# Patient Record
Sex: MALE | Race: WHITE | Employment: FULL TIME | URBAN - METROPOLITAN AREA
[De-identification: names, ages, dates, MRNs, and addresses within clinical notes are randomized per-mention and may not be internally consistent; named-entity substitution may affect disease eponyms.]

---

## 2020-03-09 ENCOUNTER — HOSPITAL ENCOUNTER (EMERGENCY)
Age: 25
Discharge: HOME | End: 2020-03-09
Attending: EMERGENCY MEDICINE
Payer: COMMERCIAL

## 2020-03-09 VITALS
HEIGHT: 71 IN | RESPIRATION RATE: 16 BRPM | OXYGEN SATURATION: 97 % | SYSTOLIC BLOOD PRESSURE: 111 MMHG | BODY MASS INDEX: 36.82 KG/M2 | HEART RATE: 110 BPM | WEIGHT: 263 LBS | TEMPERATURE: 98.9 F | DIASTOLIC BLOOD PRESSURE: 78 MMHG

## 2020-03-09 PROCEDURE — 6370000000 HC RX 637 (ALT 250 FOR IP): Performed by: PHYSICIAN ASSISTANT

## 2020-03-09 PROCEDURE — 99282 EMERGENCY DEPT VISIT SF MDM: CPT

## 2020-03-09 RX ORDER — AMOXICILLIN 875 MG/1
875 TABLET, COATED ORAL 2 TIMES DAILY
COMMUNITY
End: 2021-03-27 | Stop reason: ALTCHOICE

## 2020-03-09 RX ORDER — NEOMYCIN SULFATE, POLYMYXIN B SULFATE AND HYDROCORTISONE 10; 3.5; 1 MG/ML; MG/ML; [USP'U]/ML
3 SUSPENSION/ DROPS AURICULAR (OTIC) ONCE
Status: COMPLETED | OUTPATIENT
Start: 2020-03-09 | End: 2020-03-09

## 2020-03-09 RX ORDER — IBUPROFEN 800 MG/1
800 TABLET ORAL EVERY 6 HOURS PRN
COMMUNITY
End: 2022-02-24 | Stop reason: ALTCHOICE

## 2020-03-09 RX ORDER — HYDROCODONE BITARTRATE AND ACETAMINOPHEN 5; 325 MG/1; MG/1
1 TABLET ORAL ONCE
Status: COMPLETED | OUTPATIENT
Start: 2020-03-09 | End: 2020-03-09

## 2020-03-09 RX ORDER — HYDROCODONE BITARTRATE AND ACETAMINOPHEN 5; 325 MG/1; MG/1
1 TABLET ORAL EVERY 8 HOURS PRN
Qty: 10 TABLET | Refills: 0 | Status: SHIPPED | OUTPATIENT
Start: 2020-03-09 | End: 2020-03-12

## 2020-03-09 RX ORDER — CIPROFLOXACIN AND DEXAMETHASONE 3; 1 MG/ML; MG/ML
4 SUSPENSION/ DROPS AURICULAR (OTIC) 2 TIMES DAILY
COMMUNITY
End: 2021-03-29 | Stop reason: ALTCHOICE

## 2020-03-09 RX ADMIN — NEOMYCIN SULFATE, POLYMYXIN B SULFATE AND HYDROCORTISONE 3 DROP: 10; 3.5; 1 SUSPENSION/ DROPS AURICULAR (OTIC) at 21:40

## 2020-03-09 RX ADMIN — HYDROCODONE BITARTRATE AND ACETAMINOPHEN 1 TABLET: 5; 325 TABLET ORAL at 21:39

## 2020-03-09 ASSESSMENT — PAIN DESCRIPTION - LOCATION: LOCATION: EAR

## 2020-03-09 ASSESSMENT — PAIN DESCRIPTION - ORIENTATION: ORIENTATION: RIGHT;LEFT

## 2020-03-09 ASSESSMENT — PAIN SCALES - GENERAL: PAINLEVEL_OUTOF10: 9

## 2020-03-10 NOTE — ED PROVIDER NOTES
eMERGENCY dEPARTMENT eNCOUnter   Independent Attestation     Pt Name: Carmen Geiger  MRN: [id number]  [place] 1995  Date of evaluation: 3/10/20     Carmen Geiger is a 30 y.o. male with CC: Otalgia (bilat right greater than left with pain)      Based on the medical record the care appears appropriate. I was personally available for consultation in the Emergency Department.     Yordy Helton MD  Attending Emergency Physician                    Shana Velasquez MD  03/10/20 9579

## 2020-03-10 NOTE — ED PROVIDER NOTES
reactive to light  HENT:  Atraumatic. External ears normal, bilateral TM not visualized due to complete obstruction of the bilateral ear canals,  Nose normal, oropharynx moist. Neck- supple, no lymphadenopathy  Respiratory:  Clear to auscultation bilaterally with good air exchange, no W/R/R  Cardiovascular:  RRR with normal S1 and S2  Gastrointestinal/Abdomen:  Soft, nontender. BS active in all quadrants. Musculoskeletal:  No edema, no tenderness, no deformities. Integument:  No rash, no diaphoresis. Neurologic:  Alert & oriented x 3, no focal deficits noted       DIAGNOSTIC RESULTS     RADIOLOGY:   Reviewed the radiologist:  No orders to display           LABS:  Labs Reviewed - No data to display      EMERGENCY DEPARTMENT COURSE:   -------------------------  Ear aline placed bilaterally with polymyxin, instructed to continue with medication as prescribed previously, will give pain medication due to pain give note for work. Orders Placed This Encounter   Medications    HYDROcodone-acetaminophen (NORCO) 5-325 MG per tablet 1 tablet    neomycin-polymyxin-hydrocortisone (CORTISPORIN) otic suspension 3 drop    HYDROcodone-acetaminophen (NORCO) 5-325 MG per tablet     Sig: Take 1 tablet by mouth every 8 hours as needed for Pain for up to 3 days. Dispense:  10 tablet     Refill:  0       CONSULTS:  None      FINAL IMPRESSION      1.  Infective otitis externa of both ears          DISPOSITION/PLAN:  DISPOSITION Decision To Discharge 03/09/2020 10:53:49 PM        PATIENT REFERRED TO:  David Martin  [address] [place] [address] [address] [facility]  [address]    Schedule an appointment as soon as possible for a visit       [address] [place] [address]  [facility]  [address] [facility]  [phone]    For worsening symptoms, or any other concern      DISCHARGE MEDICATIONS:  Discharge Medication List as of 3/9/2020 10:54 PM      START taking these medications    Details   HYDROcodone-acetaminophen ([address]) 5-325 MG per tablet Take 1 tablet by mouth every 8 hours as needed for Pain for up to 3 days. , Disp-10 tablet, R-0Print             (Please note that portions of this note were completed with a voice recognition program.  Efforts were made to edit the dictations but occasionally words are mis-transcribed.)    JA Camargo PA  03/10/20 0028

## 2020-08-21 ENCOUNTER — HOSPITAL ENCOUNTER (OUTPATIENT)
Age: 25
Discharge: HOME | End: 2020-08-21
Payer: COMMERCIAL

## 2020-08-21 PROCEDURE — U0003 INFECTIOUS AGENT DETECTION BY NUCLEIC ACID (DNA OR RNA); SEVERE ACUTE RESPIRATORY SYNDROME CORONAVIRUS 2 (SARS-COV-2) (CORONAVIRUS DISEASE [COVID-19]), AMPLIFIED PROBE TECHNIQUE, MAKING USE OF HIGH THROUGHPUT TECHNOLOGIES AS DESCRIBED BY CMS-2020-01-R: HCPCS

## 2020-08-23 LAB — SARS-COV-2, NAA: NOT DETECTED

## 2020-12-15 ENCOUNTER — HOSPITAL ENCOUNTER (OUTPATIENT)
Age: 25
Discharge: HOME | End: 2020-12-15
Payer: COMMERCIAL

## 2020-12-15 PROCEDURE — U0003 INFECTIOUS AGENT DETECTION BY NUCLEIC ACID (DNA OR RNA); SEVERE ACUTE RESPIRATORY SYNDROME CORONAVIRUS 2 (SARS-COV-2) (CORONAVIRUS DISEASE [COVID-19]), AMPLIFIED PROBE TECHNIQUE, MAKING USE OF HIGH THROUGHPUT TECHNOLOGIES AS DESCRIBED BY CMS-2020-01-R: HCPCS

## 2020-12-16 LAB
SARS-COV-2, RAPID: ABNORMAL
SARS-COV-2: ABNORMAL
SARS-COV-2: DETECTED
SOURCE: ABNORMAL

## 2020-12-17 ENCOUNTER — TELEPHONE (OUTPATIENT)
Dept: ADMINISTRATIVE | Age: 25
End: 2020-12-17

## 2021-03-28 NOTE — PROGRESS NOTES
[facility] [[email or web address]] PHYSICIANS  [facility]  [address] [facility] [address].  [address] [address]  Dept: [phone]     3/29/2021 No chief complaint on file. CADENCE Marroquin (:  1995) is a 27 y.o. male is an established patient ***. Patient has a history of ***  No data recorded    []Negative depression screening. []1-4 = Minimal depression   []5-9 = Mild depression   []10-14 = Moderate depression   []15-19 = Moderately severe depression   []20-27 = Severe depression  No flowsheet data found. Counseling given: Not Answered    There is no problem list on file for this patient. No past medical history on file. Past Surgical History:   Procedure Laterality Date    KNEE SURGERY      TONSILLECTOMY     No family history on file. Current Outpatient Medications   Medication Sig Dispense Refill    ibuprofen (ADVIL;MOTRIN) 800 MG tablet Take 800 mg by mouth every 6 hours as needed for Pain      amoxicillin (AMOXIL) 875 MG tablet Take 875 mg by mouth 2 times daily      ciprofloxacin-dexamethasone (CIPRODEX) 0.3-0.1 % otic suspension 4 drops 2 times daily       No current facility-administered medications for this visit. Review of Systems   Prior to Visit Medications    Medication Sig Taking? Authorizing Provider   ibuprofen (ADVIL;MOTRIN) 800 MG tablet Take 800 mg by mouth every 6 hours as needed for Pain  Historical Provider, MD   amoxicillin (AMOXIL) 875 MG tablet Take 875 mg by mouth 2 times daily  Historical Provider, MD   ciprofloxacin-dexamethasone (CIPRODEX) 0.3-0.1 % otic suspension 4 drops 2 times daily  Historical Provider, MD      Social History     Tobacco Use    Smoking status: Never Smoker    Smokeless tobacco: Never Used   Substance Use Topics    Alcohol use: Yes      There were no vitals filed for this visit. Estimated body mass index is 36.68 kg/m² as calculated from the following:    Height as of 3/9/20: 5' 11\" (1.803 m).     Weight as of 3/9/20: 263 lb (119.3 kg).  Physical Exam  ASSESSMENT/PLAN:  There are no diagnoses linked to this encounter. Controlled Substance Monitoring:  Acute and Chronic Pain Monitoring:   RX Monitoring 8/19/2015   Periodic Controlled Substance Monitoring No signs of potential drug abuse or diversion identified. No orders of the defined types were placed in this encounter. No orders of the defined types were placed in this encounter. There are no discontinued medications. Health Maintenance Due   Topic Date Due    Hepatitis C screen  Never done    Varicella vaccine (1 of 2 - 2-dose childhood series) Never done    HIV screen  Never done    COVID-19 Vaccine (1) Never done    DTaP/Tdap/Td vaccine (1 - Tdap) Never done    Flu vaccine (1) Never done      No follow-ups on file. {Provider UDLB:[id number]}   This note was completed by using the assistance of a speech-recognition program. However, inadvertent computerized transcription errors may be present. Although every effort was made to ensure accuracy, no guarantees can be provided that every mistake has been identified and corrected by editing   An electronic signature was used to authenticate this note.   --HARDIK Mcgarry - CNP on 3/27/2021 at 8:51 PM

## 2021-03-28 NOTE — PATIENT INSTRUCTIONS
[facility] COVID-19 Vaccination Hotline: [phone]    COVID-19 vaccine appointments are not available through our practice. As you're eligible to receive the COVID-19 vaccine, as determined by your state's department of health, you will be able to schedule an appointment through [address] or by calling [phone]. Appointments are required to receive the COVID-19 vaccine. As vaccine supply continues to be limited, we anticipate open appointments to fill up quickly and appreciate your patience as we work through the process of providing vaccines to those in our communities. Schedule a Vaccine  When you qualify to receive the vaccine, call the [facility] COVID-19 Vaccination Hotline to schedule your appointment or to get additional information about the [facility] locations which are offering the COVID-19 vaccine. To be 94% effective, it's important that you receive two doses of one of the COVID-19 vaccines. -If you are receiving the Valladares Peter vaccine, your second shot will be scheduled as close to 21 days after the first shot as possible. -If you are receiving the Moderna vaccine, your second shot will be scheduled as close to 28 days after the first shot as possible. Links to [facility]) website and [facility] website:    [employer]/mercy-[facility]-monitoring-coronavirus-covid-19/covid-19-vaccine/[place]/duran-vaccine    [email or web address]/covidvaccine    "[employer]  https://sites. google. com/view/wchd[place]-coronavirus/home/vaccines/get-vaccinated  LocalElectrolysis.fi. com/r/[place]OVID_Vaccine_On-Call_List      https://Curasight/shared/[email or web address]?:toolbar=n&:display_count=n&:origin=viz_share_link&:embed=y    PHARMACY LOCATIONS  Https://vaccine. coronavirus. [place].gov/    [facility]  Https://Curasight/shared/[email or web address]?:toolbar=n&:display_count=n&:origin=viz_share_link&:embed=y    [facility] Https://The World [employer] Pictures/shared/[email or web address]?:toolbar=n&:display_count=n&:origin=ACell_share_link&:embed=y    [address]  Https://[employer]/shared/[email or web address]?:toolbar=n&:display_count=n&:origin=viz_share_link&:embed=y    [address]  https://The World [employer] Pictures/shared/[email or web address]?:toolbar=n&:display_count=n&:origin=ACell_share_link&:embed=y    tdapPatient Education        Tdap (Tetanus, Diphtheria, Pertussis) Vaccine: What You Need to Know  Why get vaccinated? Tdap vaccine can prevent tetanus, diphtheria, and pertussis. Diphtheria and pertussis spread from person to person. Tetanus enters the body through cuts or wounds. · TETANUS (T) causes painful stiffening of the muscles. Tetanus can lead to serious health problems, including being unable to open the mouth, having trouble swallowing and breathing, or death. · DIPHTHERIA (D) can lead to difficulty breathing, heart failure, paralysis, or death. · PERTUSSIS (aP), also known as \"whooping cough,\" can cause uncontrollable, violent coughing which makes it hard to breathe, eat, or drink. Pertussis can be extremely serious in babies and young children, causing pneumonia, convulsions, brain damage, or death. In teens and adults, it can cause weight loss, loss of bladder control, passing out, and rib fractures from severe coughing. Tdap vaccine  Tdap is only for children 7 years and older, adolescents, and adults. Adolescents should receive a single dose of Tdap, preferably at age 6 or 15 years. Pregnant women should get a dose of Tdap during every pregnancy, to protect the  from pertussis. Infants are most at risk for severe, life threatening complications from pertussis. Adults who have never received Tdap should get a dose of Tdap. Also, adults should receive a booster dose every 10 years, or earlier in the case of a severe and dirty wound or burn.  Booster doses can be either Tdap or Td (a different vaccine that protects against tetanus and diphtheria but not pertussis). Tdap may be given at the same time as other vaccines. Talk with your health care provider  Tell your vaccine provider if the person getting the vaccine:  · Has had an allergic reaction after a previous dose of any vaccine that protects against tetanus, diphtheria, or pertussis, or has any severe, life threatening allergies. · Has had a coma, decreased level of consciousness, or prolonged seizures within 7 days after a previous dose of any pertussis vaccine (DTP, DTaP, or Tdap). · Has seizures or another nervous system problem. · Has ever had Guillain-Barré Syndrome (also called GBS). · Has had severe pain or swelling after a previous dose of any vaccine that protects against tetanus or diphtheria. In some cases, your health care provider may decide to postpone Tdap vaccination to a future visit. People with minor illnesses, such as a cold, may be vaccinated. People who are moderately or severely ill should usually wait until they recover before getting Tdap vaccine. Your health care provider can give you more information. Risks of a vaccine reaction  · Pain, redness, or swelling where the shot was given, mild fever, headache, feeling tired, and nausea, vomiting, diarrhea, or stomachache sometimes happen after Tdap vaccine. People sometimes faint after medical procedures, including vaccination. Tell your provider if you feel dizzy or have vision changes or ringing in the ears. As with any medicine, there is a very remote chance of a vaccine causing a severe allergic reaction, other serious injury, or death. What if there is a serious problem? An allergic reaction could occur after the vaccinated person leaves the clinic.  If you see signs of a severe allergic reaction (hives, swelling of the face and throat, difficulty breathing, a fast heartbeat, dizziness, or weakness), call 9-1-1 and get the person to the nearest hospital.  For other signs that concern you, call your health care provider. Adverse reactions should be reported to the Vaccine Adverse Event Reporting System (VAERS). Your health care provider will usually file this report, or you can do it yourself. Visit the VAERS website at www.vaers. hhs.gov or call 3-740.891.7018. VAERS is only for reporting reactions, and VAERS staff do not give medical advice. The National Vaccine Injury Compensation Program  The National Vaccine Injury Compensation Program (VICP) is a federal program that was created to compensate people who may have been injured by certain vaccines. Visit the VICP website at www.hrsa.gov/vaccinecompensation or call 1-348.817.5689 to learn about the program and about filing a claim. There is a time limit to file a claim for compensation. How can I learn more? · Ask your health care provider. · Call your local or state health department. · Contact the Centers for Disease Control and Prevention (CDC):  ? Call 2-656.639.9341 (1-800-CDC-INFO) or  ? Visit CDC's website at www.cdc.gov/vaccines  Vaccine Information Statement (Interim)  Tdap (Tetanus, Diphtheria, Pertussis) Vaccine  04/01/2020  [id number]  Department of Health and Human Services  Centers for Disease Control and Prevention  Many Vaccine Information Statements are available in Azeri and other languages. See www.immunize.org/vis. Muchas hojas de información sobre vacunas están disponibles en español y en otros idiomas. Visite www.immunize.org/vis. Care instructions adapted under license by [facility] ([facility]). If you have questions about a medical condition or this instruction, always ask your healthcare professional. [address] any warranty or liability for your use of this information.

## 2021-03-28 NOTE — PROGRESS NOTES
[facility] & [facility] PHYSICIANS  [facility] PHYSICIANS [address] [facility] [address].  [address] [address]  Dept: Girish Asif (:  1995) is a 27 y.o. male,Established patient, here for evaluation of the following chief complaint(s):  Chief Complaint   Patient presents with    New Patient    Establish Care    GI Problem     x 2 years         SUBJECTIVE/OBJECTIVE:  HPI   Patient to the office. He was referred by a coworker that also worked for him in the past.  Patient is fairly healthy. Patient admits to smoking cigars occasionally 1 or twice a month. He does admit to drinking alcohol 1-2 times a week. He drinks beer anywhere from 2 to 12 pack at a time depending on the location. He denies any use of any type of recreational drugs such as marijuana. He is single, living on his own. Has had long-term relationship in the past no kids. 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 He is coming from a family a paramedics. He is to play football in high school and most other sports including wrestling. Patient reported a right meniscal tear from playing sports. Repair was done when he was 10 years ago with no follow-up done no current issues. Patient also reported a previous tonsillectomy due to some recurrent tonsillitis. This was also done several days ago no follow-up done no current problems. Patient also reported a previous Covid infection back in December. Patient did not have any critical symptoms and did not tell me in the hospital in fact, his symptoms were minor. He stayed home in quarantine appropriately. He does not want to get his Covid vaccine done. We had some discussion about this. He also does not want to have any other vaccines including influenza. GERD  Patient reported an ongoing and worsening symptoms of GERD. He reports some heartburn, dyspepsia, and indigestion. This has been going on for a while antacid. testicular cancer on his paternal side. His uncle specifically. Vitals:    03/29/21 1030   BP: 110/80   Pulse: 97   Temp: 97.9 °F (36.6 °C)   SpO2: 98%   Weight: 261 lb (118.4 kg)   Height: 5' 11\" (1.803 m)   Estimated body mass index is 36.4 kg/m² as calculated from the following:    Height as of this encounter: 5' 11\" (1.803 m). Weight as of this encounter: 261 lb (118.4 kg). Review of Systems   Constitutional: Positive for unexpected weight change. Negative for activity change, chills and fever. HENT: Negative. Respiratory: Negative. Negative for shortness of breath. Cardiovascular: Negative. Negative for chest pain and palpitations. Gastrointestinal: Positive for abdominal pain, constipation and diarrhea. Heartburn   Endocrine: Negative. Genitourinary: Negative for dysuria. Musculoskeletal: Negative for arthralgias and myalgias. Skin: Negative for rash. Neurological: Negative for headaches. Psychiatric/Behavioral: Negative for sleep disturbance and suicidal ideas. The patient is nervous/anxious. Physical Exam  Vitals signs and nursing note reviewed. Constitutional:       Appearance: He is well-developed. He is obese. HENT:      Head: Normocephalic. Right Ear: External ear normal.      Left Ear: External ear normal.      Nose: Nose normal.      Mouth/Throat:      Mouth: Mucous membranes are moist.   Eyes:      Pupils: Pupils are equal, round, and reactive to light. Neck:      Musculoskeletal: Normal range of motion and neck supple. Cardiovascular:      Rate and Rhythm: Normal rate and regular rhythm. Pulses: Normal pulses. Heart sounds: Normal heart sounds. Pulmonary:      Effort: Pulmonary effort is normal. No respiratory distress. Breath sounds: Normal breath sounds. Abdominal:      General: Abdomen is protuberant. Bowel sounds are normal. There is no distension. Palpations: Abdomen is soft. Tenderness:  There is no abdominal tenderness. Comments: obese   Musculoskeletal: Normal range of motion. Skin:     General: Skin is warm and dry. Capillary Refill: Capillary refill takes less than 2 seconds. Neurological:      Mental Status: He is alert and oriented to person, place, and time. Psychiatric:         Mood and Affect: Mood is anxious. Speech: Speech is not rapid and pressured. Behavior: Behavior normal.         Thought Content: Thought content does not include homicidal or suicidal ideation. History reviewed. No pertinent past medical history. Prior to Visit Medications    Medication Sig Taking? Authorizing Provider   famotidine (PEPCID) 20 MG tablet Take 1 tablet by mouth 2 times daily Yes HARDIK Burns - CNP   dicyclomine (BENTYL) 10 MG capsule Take 1 capsule by mouth 4 times daily Yes HARDIK Burns CNP   ibuprofen (ADVIL;MOTRIN) 800 MG tablet Take 800 mg by mouth every 6 hours as needed for Pain  Historical Provider, MD       ASSESSMENT/PLAN:  1. Gastroesophageal reflux disease, unspecified whether esophagitis present  Worsening  Continue therapy  DISCUSSED AND ADVISED TO:  Avoid food triggers. Stop eating large meals close to bedtime  Don't eat meals too close to bedtime  Avoid ASA, NSAID's, caffeine, peppermints, alcohol and tobacco.  Report for worsening symptoms. -     famotidine (PEPCID) 20 MG tablet; Take 1 tablet by mouth 2 times daily, Disp-60 tablet, R-3Normal  2. Irritable bowel syndrome with both constipation and diarrhea  Failure to Improve  Continue therapy. DISCUSSED and ADVISED TO:  Drink plenty of fluids, 1.5 to 2 liters a day  Increase high-fiber foods  with 25-30 g each day. These include fruits, vegetables, beans, and whole grains. Get at least 30 minutes of exercise on most days of the week. Take a fiber supplement, such as Citrucel (Methylcellulose fiber) or Metamucil (Psyllium), every day. Schedule time each day for a bowel movement. [place]     --Richa Matthew, APRN - CNP

## 2021-03-29 ENCOUNTER — OFFICE VISIT (OUTPATIENT)
Dept: FAMILY MEDICINE CLINIC | Age: 26
End: 2021-03-29
Payer: COMMERCIAL

## 2021-03-29 ENCOUNTER — HOSPITAL ENCOUNTER (OUTPATIENT)
Age: 26
Setting detail: SPECIMEN
Discharge: HOME | End: 2021-03-29
Payer: COMMERCIAL

## 2021-03-29 VITALS
SYSTOLIC BLOOD PRESSURE: 110 MMHG | HEART RATE: 97 BPM | BODY MASS INDEX: 36.54 KG/M2 | TEMPERATURE: 97.9 F | OXYGEN SATURATION: 98 % | WEIGHT: 261 LBS | DIASTOLIC BLOOD PRESSURE: 80 MMHG | HEIGHT: 71 IN

## 2021-03-29 DIAGNOSIS — E66.9 OBESITY (BMI 35.0-39.9 WITHOUT COMORBIDITY): ICD-10-CM

## 2021-03-29 DIAGNOSIS — Z13.29 SCREENING FOR ENDOCRINE DISORDER: ICD-10-CM

## 2021-03-29 DIAGNOSIS — Z13.21 ENCOUNTER FOR VITAMIN DEFICIENCY SCREENING: ICD-10-CM

## 2021-03-29 DIAGNOSIS — Z11.59 SCREENING FOR VIRAL DISEASE: ICD-10-CM

## 2021-03-29 DIAGNOSIS — Z13.220 SCREENING FOR LIPID DISORDERS: ICD-10-CM

## 2021-03-29 DIAGNOSIS — R73.9 HYPERGLYCEMIA: ICD-10-CM

## 2021-03-29 DIAGNOSIS — Z86.16 HISTORY OF COVID-19: ICD-10-CM

## 2021-03-29 DIAGNOSIS — K58.2 IRRITABLE BOWEL SYNDROME WITH BOTH CONSTIPATION AND DIARRHEA: ICD-10-CM

## 2021-03-29 DIAGNOSIS — Z98.890 S/P MEDIAL MENISCUS REPAIR OF RIGHT KNEE: ICD-10-CM

## 2021-03-29 DIAGNOSIS — Z80.43 FAMILY HISTORY OF TESTICULAR CANCER: ICD-10-CM

## 2021-03-29 DIAGNOSIS — K21.9 GASTROESOPHAGEAL REFLUX DISEASE, UNSPECIFIED WHETHER ESOPHAGITIS PRESENT: Primary | ICD-10-CM

## 2021-03-29 DIAGNOSIS — Z90.89 S/P TONSILLECTOMY: ICD-10-CM

## 2021-03-29 LAB
-: NORMAL
ABSOLUTE EOS #: 0.13 K/UL (ref 0–0.44)
ABSOLUTE IMMATURE GRANULOCYTE: 0.03 K/UL (ref 0–0.3)
ABSOLUTE LYMPH #: 1.62 K/UL (ref 1.1–3.7)
ABSOLUTE MONO #: 0.54 K/UL (ref 0.1–1.2)
AMORPHOUS: NORMAL
BACTERIA: NORMAL
BASOPHILS # BLD: 1 % (ref 0–2)
BASOPHILS ABSOLUTE: 0.06 K/UL (ref 0–0.2)
BILIRUBIN URINE: NEGATIVE
CASTS UA: NORMAL /LPF (ref 0–8)
COLOR: YELLOW
COMMENT UA: ABNORMAL
CRYSTALS, UA: NORMAL /HPF
DIFFERENTIAL TYPE: ABNORMAL
EOSINOPHILS RELATIVE PERCENT: 2 % (ref 1–4)
EPITHELIAL CELLS UA: NORMAL /HPF (ref 0–5)
GLUCOSE URINE: NEGATIVE
HCT VFR BLD CALC: 51.1 % (ref 40.7–50.3)
HEMOGLOBIN: 16.6 G/DL (ref 13–17)
HEPATITIS C ANTIBODY: NONREACTIVE
HIV AG/AB: NONREACTIVE
IMMATURE GRANULOCYTES: 1 %
KETONES, URINE: ABNORMAL
LEUKOCYTE ESTERASE, URINE: NEGATIVE
LYMPHOCYTES # BLD: 26 % (ref 24–43)
MCH RBC QN AUTO: 30 PG (ref 25.2–33.5)
MCHC RBC AUTO-ENTMCNC: 32.5 G/DL (ref 28.4–34.8)
MCV RBC AUTO: 92.4 FL (ref 82.6–102.9)
MONOCYTES # BLD: 9 % (ref 3–12)
MUCUS: NORMAL
NITRITE, URINE: NEGATIVE
NRBC AUTOMATED: 0 PER 100 WBC
OTHER OBSERVATIONS UA: NORMAL
PDW BLD-RTO: 13 % (ref 11.8–14.4)
PH UA: 7.5 (ref 5–8)
PLATELET # BLD: 262 K/UL (ref 138–453)
PLATELET ESTIMATE: ABNORMAL
PMV BLD AUTO: 10.4 FL (ref 8.1–13.5)
PROTEIN UA: ABNORMAL
RBC # BLD: 5.53 M/UL (ref 4.21–5.77)
RBC # BLD: ABNORMAL 10*6/UL
RBC UA: NORMAL /HPF (ref 0–4)
RENAL EPITHELIAL, UA: NORMAL /HPF
SEG NEUTROPHILS: 61 % (ref 36–65)
SEGMENTED NEUTROPHILS ABSOLUTE COUNT: 3.81 K/UL (ref 1.5–8.1)
SPECIFIC GRAVITY UA: 1.03 (ref 1–1.03)
TRICHOMONAS: NORMAL
TURBIDITY: ABNORMAL
URINE HGB: NEGATIVE
UROBILINOGEN, URINE: NORMAL
VITAMIN D 25-HYDROXY: 22.1 NG/ML (ref 30–100)
WBC # BLD: 6.2 K/UL (ref 3.5–11.3)
WBC # BLD: ABNORMAL 10*3/UL
WBC UA: NORMAL /HPF (ref 0–5)
YEAST: NORMAL

## 2021-03-29 PROCEDURE — 99204 OFFICE O/P NEW MOD 45 MIN: CPT | Performed by: FAMILY MEDICINE

## 2021-03-29 PROCEDURE — G8417 CALC BMI ABV UP PARAM F/U: HCPCS | Performed by: FAMILY MEDICINE

## 2021-03-29 PROCEDURE — G8484 FLU IMMUNIZE NO ADMIN: HCPCS | Performed by: FAMILY MEDICINE

## 2021-03-29 PROCEDURE — G8427 DOCREV CUR MEDS BY ELIG CLIN: HCPCS | Performed by: FAMILY MEDICINE

## 2021-03-29 PROCEDURE — 1036F TOBACCO NON-USER: CPT | Performed by: FAMILY MEDICINE

## 2021-03-29 PROCEDURE — 81003 URINALYSIS AUTO W/O SCOPE: CPT | Performed by: FAMILY MEDICINE

## 2021-03-29 RX ORDER — DICYCLOMINE HYDROCHLORIDE 10 MG/1
10 CAPSULE ORAL 4 TIMES DAILY
Qty: 360 CAPSULE | Refills: 1 | Status: SHIPPED | OUTPATIENT
Start: 2021-03-29 | End: 2021-09-17

## 2021-03-29 RX ORDER — FAMOTIDINE 20 MG/1
20 TABLET, FILM COATED ORAL 2 TIMES DAILY
Qty: 60 TABLET | Refills: 3 | Status: SHIPPED | OUTPATIENT
Start: 2021-03-29 | End: 2021-07-21

## 2021-03-29 ASSESSMENT — ENCOUNTER SYMPTOMS
DIARRHEA: 1
CONSTIPATION: 1
ABDOMINAL PAIN: 1
RESPIRATORY NEGATIVE: 1
SHORTNESS OF BREATH: 0

## 2021-03-29 ASSESSMENT — PATIENT HEALTH QUESTIONNAIRE - PHQ9
1. LITTLE INTEREST OR PLEASURE IN DOING THINGS: 0
SUM OF ALL RESPONSES TO PHQ QUESTIONS 1-9: 0
2. FEELING DOWN, DEPRESSED OR HOPELESS: 0

## 2021-03-30 LAB
ALBUMIN SERPL-MCNC: 4.8 G/DL (ref 3.5–5.2)
ALBUMIN/GLOBULIN RATIO: 1.7 (ref 1–2.5)
ALP BLD-CCNC: 70 U/L (ref 40–129)
ALT SERPL-CCNC: 43 U/L (ref 5–41)
ANION GAP SERPL CALCULATED.3IONS-SCNC: 14 MMOL/L (ref 9–17)
AST SERPL-CCNC: 28 U/L
BILIRUB SERPL-MCNC: 0.53 MG/DL (ref 0.3–1.2)
BUN BLDV-MCNC: 18 MG/DL (ref 6–20)
BUN/CREAT BLD: ABNORMAL (ref 9–20)
CALCIUM SERPL-MCNC: 9.8 MG/DL (ref 8.6–10.4)
CHLORIDE BLD-SCNC: 107 MMOL/L (ref 98–107)
CHOLESTEROL, FASTING: 195 MG/DL
CHOLESTEROL/HDL RATIO: 4.5
CO2: 24 MMOL/L (ref 20–31)
CREAT SERPL-MCNC: 1.27 MG/DL (ref 0.7–1.2)
ESTIMATED AVERAGE GLUCOSE: 108 MG/DL
GFR AFRICAN AMERICAN: >60 ML/MIN
GFR NON-AFRICAN AMERICAN: >60 ML/MIN
GFR SERPL CREATININE-BSD FRML MDRD: ABNORMAL ML/MIN/{1.73_M2}
GFR SERPL CREATININE-BSD FRML MDRD: ABNORMAL ML/MIN/{1.73_M2}
GLUCOSE FASTING: 99 MG/DL (ref 70–99)
HBA1C MFR BLD: 5.4 % (ref 4–6)
HDLC SERPL-MCNC: 43 MG/DL
LDL CHOLESTEROL: 132 MG/DL (ref 0–130)
POTASSIUM SERPL-SCNC: 4.6 MMOL/L (ref 3.7–5.3)
SODIUM BLD-SCNC: 145 MMOL/L (ref 135–144)
TOTAL PROTEIN: 7.7 G/DL (ref 6.4–8.3)
TRIGLYCERIDE, FASTING: 102 MG/DL
TSH SERPL DL<=0.05 MIU/L-ACNC: 1.68 MIU/L (ref 0.3–5)
VLDLC SERPL CALC-MCNC: ABNORMAL MG/DL (ref 1–30)

## 2021-03-31 ENCOUNTER — TELEPHONE (OUTPATIENT)
Dept: FAMILY MEDICINE CLINIC | Age: 26
End: 2021-03-31

## 2021-03-31 DIAGNOSIS — E55.9 VITAMIN D DEFICIENCY: Primary | ICD-10-CM

## 2021-03-31 LAB — VZV IGG SER QL IA: 2.64

## 2021-03-31 NOTE — TELEPHONE ENCOUNTER
Please let the patient know of the recent results. These can also be discussed further on the future visits. Patient's blood count is WNL  . Lab Results   Component Value Date    WBC 6.2 03/29/2021    HGB 16.6 03/29/2021    HCT 51.1 (H) 03/29/2021    MCV 92.4 03/29/2021     03/29/2021    LYMPHOPCT 26 03/29/2021    RBC 5.53 03/29/2021    MCH 30.0 03/29/2021    MCHC 32.5 03/29/2021    RDW 13.0 03/29/2021       Patient's thyroid function is WNL  . Lab Results   Component Value Date    TSH 1.68 03/29/2021       Patient's kidney function has some mild increase also an increase in salt. Pls advise to drink more fluids, cut down salt. .    One of his liver function is elevated. This is not very concerning. However, cut down on alcohol use. Lab Results   Component Value Date     (H) 03/29/2021    K 4.6 03/29/2021     03/29/2021    CO2 24 03/29/2021    BUN 18 03/29/2021    CREATININE 1.27 (H) 03/29/2021    GLUCOSE 101 (H) 07/20/2016    CALCIUM 9.8 03/29/2021    PROT 7.7 03/29/2021    LABALBU 4.8 03/29/2021    BILITOT 0.53 03/29/2021    ALKPHOS 70 03/29/2021    AST 28 03/29/2021    ALT 43 (H) 03/29/2021    LABGLOM >60 03/29/2021    GFRAA >60 03/29/2021    GLOB NOT REPORTED 07/20/2016       Patient's urinalysis results has ketones present. Please advise to drink more fluids. .  Lab Results   Component Value Date    COLORU YELLOW 03/29/2021    NITRU NEGATIVE 03/29/2021    GLUCOSEU NEGATIVE 03/29/2021    KETUA TRACE 03/29/2021    UROBILINOGEN Normal 03/29/2021    BILIRUBINUR NEGATIVE 03/29/2021    HGBUR NEGATIVE 03/29/2021    PROTEINU NEGATIVE  Verified by sulfosalicylic acid test. 30/66/8718    PHUR 7.5 03/29/2021    LEUKOCYTESUR NEGATIVE 03/29/2021     No results found for: CULTURE, SPECDESC, SPECIAL    Diabetes screening normal.  Lab Results   Component Value Date/Time    LABA1C 5.4 03/29/2021 11:38 AM        Vitamin D Screening  Please let the patient know that the patient's recent vitamin d level was insufficient. I will send an oral supplementation that needs to be taken weekly. We will be checking his levels on an annual basis from now on. We can discuss this condition further on her next visit. Lab Results   Component Value Date/Time    VITD25 22.1 (L) 03/29/2021 11:38 AM         Patient's lipids test results elevated bad cholesterol. Please advise the patient to:  Cut down on red meats and trans fats in their diet. Increase physical activity. Add some regular exercise at least 3 times a week on their routine  Try to lose weight to help improve their cholesterol levels. Lab Results   Component Value Date/Time    CHOLFAST 195 03/29/2021 11:38 AM    CHOL 168 07/20/2016 10:44 AM    HDL 43 03/29/2021 11:38 AM    LDLCHOLESTEROL 132 (H) 03/29/2021 11:38 AM    TRIG 84 07/20/2016 10:44 AM    CHOLHDLRATIO 4.5 03/29/2021 11:38 AM    VLDL NOT REPORTED 03/29/2021 11:38 AM       Patient's Hepatitis C screening came back negative. Lab Results   Component Value Date/Time    HEPCAB NONREACTIVE 03/29/2021 11:38 AM       Patient's HIV screening results came back negative. Lab Results   Component Value Date/Time    HIVAG/AB NONREACTIVE 03/29/2021 11:38 AM       Patient's Varicella titers came back normal. There will not be any need for any booster dose.    Lab Results   Component Value Date/Time    VARICELABIGG 2.64 03/29/2021 11:38 AM

## 2021-04-25 ASSESSMENT — ENCOUNTER SYMPTOMS
SHORTNESS OF BREATH: 0
CONSTIPATION: 1
RESPIRATORY NEGATIVE: 1
DIARRHEA: 1
ABDOMINAL PAIN: 1

## 2021-04-25 NOTE — PROGRESS NOTES
[facility]  [facility] [address] [facility] [address].  [address] [id number]  Dept: Girish Asif (:  1995) is a 27 y.o. male,Established patient, here for evaluation of the following chief complaint(s):  Chief Complaint   Patient presents with    Results        SUBJECTIVE/OBJECTIVE:  HPI   Renea Miller is a 27 y.o. male patient. Patient is an established patient of mine. Patient is also here to review blood work results. GERD  Patient reported a chronic symptoms related to GERD. He reports some heartburn, dyspepsia, and indigestion. This has been going on for a while antacid. Patient have used 14-day Prilosec in the past which worked while he was on it. However, he stopped taking it and symptoms are back. Patient is aware of some of the food triggers which he tries to avoid. Food that he noted were pizza he thinks that is mostly the minor sauce. He tries to avoid spicy food because that is also 1 big trigger. Patient have also tried some Tums in the past which provides very poor relief but continues to use it. No previous evaluation done. Pepcid started on previous visit. Patient reports fair success. Patient states that he still took a few times with specific food triggers. ABDOMINAL PAIN DIARRHEA CONSTIPATION  Patient has a known history of IBS mixed. Patient has been having some issues with diarrhea and constipation for awhile now. Patient felt like it had gotten worse in the past 2 months. Patient states that he would have diarrhea and continue to take Imodium and then afterwards will have constipation after that. Patient have never had any evaluation done as well. Patient states that he is never noticed any blood in his stools. He does complain of some abdominal cramps. Patient started on Bentyl. He has never been to the any emergency room or urgent care for any evaluation for any of this.   Patient had tried taking probiotics which provides very mild relief. He is aware that he is probably going to need to add a little bit more fiber to his diet. Patient reports great success with Bentyl. DYSLIPIDEMIA  Kurt Khan has a known history of dyslipidemia. Patient is on OTC Fish Oil to help improve dyslipidemia. Patient denies adverse reaction to this therapy. The patient is not known to have coexisting coronary artery disease. ASCVD Score below. The CVD Risk score (Clinton, et al., 2008) failed to calculate for the following reasons: The 2008 CVD risk score is only valid for ages 27 to 76  Lab Results   Component Value Date/Time    CHOLFAST 195 03/29/2021 11:38 AM    CHOL 168 07/20/2016 10:44 AM    HDL 43 03/29/2021 11:38 AM    LDLCHOLESTEROL 132 (H) 03/29/2021 11:38 AM    TRIGLYCFAST 102 03/29/2021 11:38 AM    CHOLHDLRATIO 4.5 03/29/2021 11:38 AM    VLDL NOT REPORTED 03/29/2021 11:38 AM     VITAMIN D  Patient has a known Vitamin D Deficiency. he had a course of supplementation for 12 weeks. he is due to get levels check. We continue to discuss ways to manage this condition. We also discussed ways to improve the levels. Such as learning food groups that are high in Vitamin D. We also discuss that sun exposure is beneficial however, too much sun and sun damage can potentially result in skin cancer. Lab Results   Component Value Date/Time    VITD25 22.1 (L) 03/29/2021 11:38 AM     Wt Readings from Last 3 Encounters:   04/26/21 261 lb (118.4 kg)   03/29/21 261 lb (118.4 kg)   03/09/20 263 lb (119.3 kg)   -diet: We will continue to encourage. Discussed Mediterranean diet, and macros diet  -exercise:T 12 started- Weights at work. Patient declined to use Adipex at this time    LFT's- Mild elevation.  Reminded to cut down alcohol use  Lab Results   Component Value Date/Time    ALKPHOS 70 03/29/2021 11:38 AM    ALT 43 (H) 03/29/2021 11:38 AM    AST 28 03/29/2021 11:38 AM    BILITOT 0.53 03/29/2021 11:38 AM    PROT 7.7 03/29/2021 NEGATIVE 03/29/2021    PROTEINU NEGATIVE  Verified by sulfosalicylic acid test. 12/59/1575    PHUR 7.5 03/29/2021    LEUKOCYTESUR NEGATIVE 03/29/2021     Patient's Hepatitis C screening came back negative. Lab Results   Component Value Date/Time    HEPCAB NONREACTIVE 03/29/2021 11:38 AM       Patient's HIV screening results came back negative. Lab Results   Component Value Date/Time    HIVAG/AB NONREACTIVE 03/29/2021 11:38 AM       Patient's Varicella titers came back normal. There will not be any need for any booster dose. Lab Results   Component Value Date/Time    VARICELABIGG 2.64 03/29/2021 11:38 AM      Vitals:    04/26/21 0810   BP: 118/70   Pulse: 74   Temp: 97.4 °F (36.3 °C)   SpO2: 98%   Weight: 261 lb (118.4 kg)   Height: 5' 11\" (1.803 m)   Estimated body mass index is 36.4 kg/m² as calculated from the following:    Height as of this encounter: 5' 11\" (1.803 m). Weight as of this encounter: 261 lb (118.4 kg). Review of Systems   Constitutional: Positive for unexpected weight change (losing weight). Negative for activity change, chills and fever. HENT: Negative. Respiratory: Negative. Negative for shortness of breath. Cardiovascular: Negative. Negative for chest pain and palpitations. Gastrointestinal: Positive for abdominal pain, constipation and diarrhea. Heartburn-improved   Endocrine: Negative. Genitourinary: Negative for dysuria. Musculoskeletal: Negative for arthralgias and myalgias. Skin: Negative for rash. Neurological: Negative for headaches. Psychiatric/Behavioral: Negative for sleep disturbance and suicidal ideas. The patient is nervous/anxious. Physical Exam  Vitals signs and nursing note reviewed. Constitutional:       Appearance: He is well-developed. He is obese. HENT:      Head: Normocephalic.       Right Ear: External ear normal.      Left Ear: External ear normal.      Nose: Nose normal.      Mouth/Throat:      Mouth: Mucous membranes are moist.   Eyes:      Pupils: Pupils are equal, round, and reactive to light. Neck:      Musculoskeletal: Normal range of motion and neck supple. Cardiovascular:      Rate and Rhythm: Normal rate and regular rhythm. Pulses: Normal pulses. Heart sounds: Normal heart sounds. Pulmonary:      Effort: Pulmonary effort is normal. No respiratory distress. Breath sounds: Normal breath sounds. Abdominal:      General: Abdomen is protuberant. Bowel sounds are normal. There is no distension. Palpations: Abdomen is soft. Tenderness: There is no abdominal tenderness. Comments: obese   Musculoskeletal: Normal range of motion. Skin:     General: Skin is warm and dry. Capillary Refill: Capillary refill takes less than 2 seconds. Neurological:      Mental Status: He is alert and oriented to person, place, and time. Psychiatric:         Mood and Affect: Mood is anxious. Speech: Speech is not rapid and pressured. Behavior: Behavior normal.         Thought Content: Thought content does not include homicidal or suicidal ideation. History reviewed. No pertinent past medical history. Prior to Visit Medications    Medication Sig Taking? Authorizing Provider   vitamin D (CHOLECALCIFEROL) 25 MCG (1000 UT) TABS tablet Take 1 tablet by mouth daily Yes HARDIK Burns CNP   Cholecalciferol 1.25 MG (86211 UT) TABS Take 1 tablet by mouth once a week Yes HARDIK Burns CNP   famotidine (PEPCID) 20 MG tablet Take 1 tablet by mouth 2 times daily Yes HARDIK Burns CNP   dicyclomine (BENTYL) 10 MG capsule Take 1 capsule by mouth 4 times daily Yes HARDIK Burns CNP   ibuprofen (ADVIL;MOTRIN) 800 MG tablet Take 800 mg by mouth every 6 hours as needed for Pain Yes Historical Provider, MD       ASSESSMENT/PLAN:  1.  Irritable bowel syndrome with both constipation and diarrhea  Improving  Change diet  Continue Bentyl use  Continue to monitor      2. Gastroesophageal reflux disease, unspecified whether esophagitis present  Improving  Continue therapy  DISCUSSED AND ADVISED TO:  Avoid food triggers. Stop eating large meals close to bedtime  Don't eat meals too close to bedtime  Avoid ASA, NSAID's, caffeine, peppermints, alcohol and tobacco.  Report for worsening symptoms. 3. Dyslipidemia  Stable  Start fish oil  Advised to decrease the consumption of red meats, fried foods, trans fats, sweets, sugary beverages. Advised to increase fish, vegetables, and fruits consumption. Advised to add fiber or OTC supplements in diet. Discussed weight loss which will result in improvement of lipids levels. Advised to increase daily physical activities and add regular exercises. 4. Vitamin D deficiency  Continue Vitamin D supplementation  DISCUSSED AND ADVISED TO:  Foods that contain a lot of vitamin D includes [place], tuna, and mackerel. Cheese, egg yolks, and beef liver have small amounts of vit D.  Milk, soy drinks, orange juice, yogurt, margarine, and some kinds of cereal have vitamin D added to them. Continue to use sunblock when out in the sun to prevent skin cancer.    - vitamin D (CHOLECALCIFEROL) 25 MCG (1000 UT) TABS tablet; Take 1 tablet by mouth daily  Dispense: 90 tablet; Refill: 5    5. Elevated LFTs  Stable  Cut down on alcohol use  Careful with protein shakes    6. Elevated serum creatinine  Failure to Improve  Monitor closely  Adv to drink lots of fluids cut down salt intake and OTC NSAIDS    - Creatinine, Serum; Future    7. Obesity (BMI 35.0-39.9 without comorbidity)  Failure to Improve  BMI decreasing  DISCUSSED AND ADVISED TO:  Eat a low-fat and low carbohydrates diet. Avoid fried foods especially fast food. Choose healthier options for snacks. Have 5-6 servings of fruits and vegetables per day. Cut down on eating processed food. Add 30 minutes to 1 hour aerobic exercise for 3-4 days a week.           Return in about 2 months (around 6/26/2021) for creatinine level check, wt. This note was completed by using the assistance of a speech-recognition program. However, inadvertent computerized transcription errors may be present. Although every effort was made to ensure accuracy, no guarantees can be provided that every mistake has been identified and corrected by editing.   Electronically signed by Delia Bence, APRN - CNP on 3/27/21 at 11:29 PM EDT     --Delia Bence, APRN - CNP

## 2021-04-26 ENCOUNTER — OFFICE VISIT (OUTPATIENT)
Dept: FAMILY MEDICINE CLINIC | Age: 26
End: 2021-04-26
Payer: COMMERCIAL

## 2021-04-26 VITALS
HEART RATE: 74 BPM | SYSTOLIC BLOOD PRESSURE: 118 MMHG | TEMPERATURE: 97.4 F | OXYGEN SATURATION: 98 % | WEIGHT: 261 LBS | DIASTOLIC BLOOD PRESSURE: 70 MMHG | BODY MASS INDEX: 36.54 KG/M2 | HEIGHT: 71 IN

## 2021-04-26 DIAGNOSIS — R79.89 ELEVATED LFTS: ICD-10-CM

## 2021-04-26 DIAGNOSIS — E78.5 DYSLIPIDEMIA: ICD-10-CM

## 2021-04-26 DIAGNOSIS — E55.9 VITAMIN D DEFICIENCY: ICD-10-CM

## 2021-04-26 DIAGNOSIS — K21.9 GASTROESOPHAGEAL REFLUX DISEASE, UNSPECIFIED WHETHER ESOPHAGITIS PRESENT: ICD-10-CM

## 2021-04-26 DIAGNOSIS — R79.89 ELEVATED SERUM CREATININE: ICD-10-CM

## 2021-04-26 DIAGNOSIS — K58.2 IRRITABLE BOWEL SYNDROME WITH BOTH CONSTIPATION AND DIARRHEA: Primary | ICD-10-CM

## 2021-04-26 DIAGNOSIS — E66.9 OBESITY (BMI 35.0-39.9 WITHOUT COMORBIDITY): ICD-10-CM

## 2021-04-26 PROCEDURE — G8427 DOCREV CUR MEDS BY ELIG CLIN: HCPCS | Performed by: FAMILY MEDICINE

## 2021-04-26 PROCEDURE — 1036F TOBACCO NON-USER: CPT | Performed by: FAMILY MEDICINE

## 2021-04-26 PROCEDURE — G8417 CALC BMI ABV UP PARAM F/U: HCPCS | Performed by: FAMILY MEDICINE

## 2021-04-26 PROCEDURE — 99214 OFFICE O/P EST MOD 30 MIN: CPT | Performed by: FAMILY MEDICINE

## 2021-04-26 NOTE — PATIENT INSTRUCTIONS
[facility] COVID-19 Vaccination Hotline: [phone]    COVID-19 vaccine appointments are not available through our practice. As you're eligible to receive the COVID-19 vaccine, as determined by your state's department of health, you will be able to schedule an appointment through [address] or by calling [phone]. Appointments are required to receive the COVID-19 vaccine. As vaccine supply continues to be limited, we anticipate open appointments to fill up quickly and appreciate your patience as we work through the process of providing vaccines to those in our communities. Schedule a Vaccine  When you qualify to receive the vaccine, call the [facility] COVID-19 Vaccination Hotline to schedule your appointment or to get additional information about the [facility] locations which are offering the COVID-19 vaccine. To be 94% effective, it's important that you receive two doses of one of the COVID-19 vaccines. -If you are receiving the Valladares Peter vaccine, your second shot will be scheduled as close to 21 days after the first shot as possible. -If you are receiving the Moderna vaccine, your second shot will be scheduled as close to 28 days after the first shot as possible. Links to [facility]) website and [facility] website:    [employer]ck [employer]oo[employer]ies/[facility]-monitoring-coronavirus-covid-19/covid-19-vaccine/[place]/duran-vaccine    https://[email or web address]/covidvaccine    britebill  https://sites. google. com/view/wchdoh[place]-coronavirus/home/vaccines/get-vaccinated  LocalElectrolysis.fi. com/r/[place]OVID_Vaccine_On-Call_List      https://Skim.it/shared/[email or web address]?:toolbar=n&:display_count=n&:origin=viz_share_link&:embed=y    PHARMACY LOCATIONS  Https://vaccine. coronavirus. [place].gov/    [facility]  Https://Skim.it/shared/[email or web address]?:toolbar=n&:display_count=n&:origin=viz_share_link&:embed=y    [facility] Https://Appear/shared/[email or web address]?:toolbar=n&:display_count=n&:origin=viz_share_link&:embed=y    [address]  Https://Appear/shared/[email or web address]?:toolbar=n&:display_count=n&:origin=viz_share_link&:embed=y    [address]  https://Appear/[email or web address]?:toolbar=n&:display_count=n&:origin=viz_share_link&:embed=y  Patient Education        Learning About the Mediterranean Diet  What is the Mediterranean diet? The Mediterranean diet is a style of eating rather than a diet plan. It features foods eaten in [place], [place], [place] and [place], and other countries along the [facility]. It emphasizes eating foods like fish, fruits, vegetables, beans, high-fiber breads and whole grains, nuts, and olive oil. This style of eating includes limited red meat, cheese, and sweets. Why choose the Mediterranean diet? A Mediterranean-style diet may improve heart health. It contains more fat than other heart-healthy diets. But the fats are mainly from nuts, unsaturated oils (such as fish oils and olive oil), and certain nut or seed oils (such as canola, soybean, or flaxseed oil). These fats may help protect the heart and blood vessels. How can you get started on the Mediterranean diet? Here are some things you can do to switch to a more Mediterranean way of eating. What to eat  · Eat a variety of fruits and vegetables each day, such as grapes, blueberries, tomatoes, broccoli, peppers, figs, olives, spinach, eggplant, beans, lentils, and chickpeas. · Eat a variety of whole-grain foods each day, such as oats, brown rice, and whole wheat bread, pasta, and couscous. · Eat fish at least 2 times a week. Try tuna, salmon, mackerel, lake trout, herring, or sardines. · Eat moderate amounts of low-fat dairy products, such as milk, cheese, or yogurt. · Eat moderate amounts of poultry and eggs.   · Choose healthy (unsaturated) fats, such as nuts, olive oil, and certain nut or seed oils like canola, soybean, and flaxseed. · Limit unhealthy (saturated) fats, such as butter, palm oil, and coconut oil. And limit fats found in animal products, such as meat and dairy products made with whole milk. Try to eat red meat only a few times a month in very small amounts. · Limit sweets and desserts to only a few times a week. This includes sugar-sweetened drinks like soda. The Mediterranean diet may also include red wine with your meal1 glass each day for women and up to 2 glasses a day for men. Tips for eating at home  · Use herbs, spices, garlic, lemon zest, and citrus juice instead of salt to add flavor to foods. · Add avocado slices to your sandwich instead of plaacios. · Have fish for lunch or dinner instead of red meat. Brush the fish with olive oil, and broil or grill it. · Sprinkle your salad with seeds or nuts instead of cheese. · Cook with olive or canola oil instead of butter or oils that are high in saturated fat. · Switch from 2% milk or whole milk to 1% or fat-free milk. · Dip raw vegetables in a vinaigrette dressing or hummus instead of dips made from mayonnaise or sour cream.  · Have a piece of fruit for dessert instead of a piece of cake. Try baked apples, or have some dried fruit. Tips for eating out  · Try broiled, grilled, baked, or poached fish instead of having it fried or breaded. · Ask your  to have your meals prepared with olive oil instead of butter. · Order dishes made with marinara sauce or sauces made from olive oil. Avoid sauces made from cream or mayonnaise. · Choose whole-grain breads, whole wheat pasta and pizza crust, brown rice, beans, and lentils. · Cut back on butter or margarine on bread. Instead, you can dip your bread in a small amount of olive oil. · Ask for a side salad or grilled vegetables instead of french fries or chips. Where can you learn more? Go to https://razia.healthDesignPax. org and sign in to your Ecopol account.  Enter O407 in the Search Health Information box to learn more about \"Learning About the Mediterranean Diet. \"     If you do not have an account, please click on the \"Sign Up Now\" link. Current as of: December 17, 2020               Content Version: 12.8  © 2006-2021 Healthwise, Incorporated. Care instructions adapted under license by [facility] ([facility]). If you have questions about a medical condition or this instruction, always ask your healthcare professional. [address] any warranty or liability for your use of this information. Patient Education        Eating Healthy Foods: Care Instructions  Your Care Instructions     Eating healthy foods can help lower your risk for disease. Healthy food gives you energy and keeps your heart strong, your brain active, your muscles working, and your bones strong. A healthy diet includes a variety of foods from the basic food groups: grains, vegetables, fruits, milk and milk products, and meat and beans. Some people may eat more of their favorite foods from only one food group and, as a result, miss getting the nutrients they need. So, it is important to pay attention not only to what you eat but also to what you are missing from your diet. You can eat a healthy, balanced diet by making a few small changes. Follow-up care is a key part of your treatment and safety. Be sure to make and go to all appointments, and call your doctor if you are having problems. It's also a good idea to know your test results and keep a list of the medicines you take. How can you care for yourself at home? Look at what you eat  · Keep a food diary for a week or two and record everything you eat or drink. Track the number of servings you eat from each food group. · For a balanced diet every day, eat a variety of:  ? 6 or more ounce-equivalents of grains, such as cereals, breads, crackers, rice, or pasta, every day.  An ounce-equivalent is 1 slice of bread, 1 cup of ready-to-eat cereal, or ½ cup of cooked rice, cooked pasta, or cooked cereal.  ? 2½ cups of vegetables, especially:  § Dark-green vegetables such as broccoli and spinach. § Orange vegetables such as carrots and sweet potatoes. § Dry beans (such as freeman and kidney beans) and peas (such as lentils). ? 2 cups of fresh, frozen, or canned fruit. A small apple or 1 banana or orange equals 1 cup. ? 3 cups of nonfat or low-fat milk, yogurt, or other milk products. ? 5½ ounces of meat and beans, such as chicken, fish, lean meat, beans, nuts, and seeds. One egg, 1 tablespoon of peanut butter, ½ ounce nuts or seeds, or ¼ cup of cooked beans equals 1 ounce of meat. · Learn how to read food labels for serving sizes and ingredients. Fast-food and convenience-food meals often contain few or no fruits or vegetables. Make sure you eat some fruits and vegetables to make the meal more nutritious. · Look at your food diary. For each food group, add up what you have eaten and then divide the total by the number of days. This will give you an idea of how much you are eating from each food group. See if you can find some ways to change your diet to make it more healthy. Start small  · Do not try to make dramatic changes to your diet all at once. You might feel that you are missing out on your favorite foods and then be more likely to fail. · Start slowly, and gradually change your habits. Try some of the following:  ? Use whole wheat bread instead of white bread. ? Use nonfat or low-fat milk instead of whole milk. ? Eat brown rice instead of white rice, and eat whole wheat pasta instead of white-flour pasta. ? Try low-fat cheeses and low-fat yogurt. ? Add more fruits and vegetables to meals and have them for snacks. ? Add lettuce, tomato, cucumber, and onion to sandwiches. ? Add fruit to yogurt and cereal.  Enjoy food  · You can still eat your favorite foods. You just may need to eat less of them.  If your favorite foods are high in fat, salt, and sugar, limit how often you eat them, but do not cut them out entirely. · Eat a wide variety of foods. Make healthy choices when eating out  · The type of restaurant you choose can help you make healthy choices. Even fast-food chains are now offering more low-fat or healthier choices on the menu. · Choose smaller portions, or take half of your meal home. · When eating out, try:  ? A veggie pizza with a whole wheat crust or grilled chicken (instead of sausage or pepperoni). ? Pasta with roasted vegetables, grilled chicken, or marinara sauce instead of cream sauce. ? A vegetable wrap or grilled chicken wrap. ? Broiled or poached food instead of fried or breaded items. Make healthy choices easy  · Buy packaged, prewashed, ready-to-eat fresh vegetables and fruits, such as baby carrots, salad mixes, and chopped or shredded broccoli and cauliflower. · Buy packaged, presliced fruits, such as melon or pineapple. · Choose 100% fruit or vegetable juice instead of soda. Limit juice intake to 4 to 6 oz (½ to ¾ cup) a day. · Blend low-fat yogurt, fruit juice, and canned or frozen fruit to make a smoothie for breakfast or a snack. Where can you learn more? Go to https://Infrastructure Networksmaryeb.healthStorm Exchange. org and sign in to your Nvigen account. Enter I993 in the [facility] box to learn more about \"Eating Healthy Foods: Care Instructions. \"     If you do not have an account, please click on the \"Sign Up Now\" link. Current as of: December 17, 2020               Content Version: 12.8  © 6313-9882 AutoNavi. Care instructions adapted under license by [facility] ([facility]). If you have questions about a medical condition or this instruction, always ask your healthcare professional. [address] any warranty or liability for your use of this information. Patient Education        Learning About Being Physically Active  What is physical activity?      Being physically active means doing any kind of activity that gets your body moving. The types of physical activity that can help you get fit and stay healthy include:  · Aerobic or \"cardio\" activities. These make your heart beat faster and make you breathe harder, such as brisk walking, riding a bike, or running. They strengthen your heart and lungs and build up your endurance. · Strength training activities. These make your muscles work against, or \"resist,\" something. Examples include lifting weights or doing push-ups. These activities help tone and strengthen your muscles and bones. · Stretches. These let you move your joints and muscles through their full range of motion. Stretching helps you be more flexible. What are the benefits of being active? Being active is one of the best things you can do for your health. It helps you to:  · Feel stronger and have more energy to do all the things you like to do. · Focus better at school or work. · Feel, think, and sleep better. · Reach and stay at a healthy weight. · Lose fat and build lean muscle. · Lower your risk for serious health problems, including diabetes, heart attack, high blood pressure, and some cancers. · Keep your heart, lungs, bones, muscles, and joints strong and healthy. How can you make being active part of your life? Start slowly. Make it your long-term goal to get at least 30 minutes of exercise on most days of the week. Walking is a good choice. You also may want to do other activities, such as running, swimming, cycling, or playing tennis or team sports. Pick activities that you likeones that make your heart beat faster, your muscles stronger, and your muscles and joints more flexible. If you find more than one thing you like doing, do them all. You don't have to do the same thing every day. Get your heart pumping every day. Any activity that makes your heart beat faster and keeps it at that rate for a while counts.   Here are some great ways to get your heart beating faster:  · Go for a brisk walk, run, or bike ride. · Go for a hike or swim. · Go in-line skating. · Play a game of touch football, basketball, or soccer. · Ride a bike. · Play tennis or racquetball. · Climb stairs. Even some household chores can be aerobicjust do them at a faster pace. Vacuuming, raking or mowing the lawn, sweeping the garage, and washing and waxing the car all can help get your heart rate up. Strengthen your muscles during the week. You don't have to lift heavy weights or grow big, bulky muscles to get stronger. Doing a few simple activities that make your muscles work against, or \"resist,\" something can help you get stronger. For example, you can:  · Do push-ups or sit-ups, which use your own body weight as resistance. · Lift weights or dumbbells or use stretch bands at home or in a gym or community center. Stretch your muscles often. Stretching will help you as you become more active. It can help you stay flexible, loosen tight muscles, and avoid injury. It can also help improve your balance and posture and can be a great way to relax. Be sure to stretch the muscles you'll be using when you work out. It's best to warm your muscles slightly before you stretch them. Walk or do some other light aerobic activity for a few minutes, and then start stretching. When you stretch your muscles:  · Do it slowly. Stretching is not about going fast or making sudden movements. · Don't push or bounce during a stretch. · Hold each stretch for at least 15 to 30 seconds, if you can. You should feel a stretch in the muscle, but not pain. · Breathe out as you do the stretch. Then breathe in as you hold the stretch. Don't hold your breath. If you're worried about how more activity might affect your health, have a checkup before you start. Follow any special advice your doctor gives you for getting a smart start. Where can you learn more? Go to https://raiza.health-partners. org and sign in to your Transaq account. Enter F595 in the [facility] box to learn more about \"Learning About Being Physically Active. \"     If you do not have an account, please click on the \"Sign Up Now\" link. Current as of: September 10, 2020               Content Version: 12.8  © 3307-8257 Sanovation. Care instructions adapted under license by [facility] ([facility]). If you have questions about a medical condition or this instruction, always ask your healthcare professional. [address] any warranty or liability for your use of this information. Patient Education        Learning About Vitamin D  Why is it important to get enough vitamin D? Your body needs vitamin D to absorb calcium. Calcium keeps your bones and muscles, including your heart, healthy and strong. If your muscles don't get enough calcium, they can cramp, hurt, or feel weak. You may have long-term (chronic) muscle aches and pains. If you don't get enough vitamin D throughout life, you have an increased chance of having thin and brittle bones (osteoporosis) in your later years. Children who don't get enough vitamin D may not grow as much as others their age. They also have a chance of getting a rare disease called rickets. It causes weak bones. Vitamin D and calcium are added to many foods. And your body uses sunshine to make its own vitamin D. How much vitamin D do you need? The recommended daily allowance (RDA) for vitamin D is 600 IU (international units) every day for people ages 3 through 79. Adults 71 and older need 800 IU every day. Blood tests for vitamin D can check your vitamin D level. But there is no standard normal range used by all laboratories. You're likely getting enough vitamin D if your levels are in the range of 20 to 50 ng/mL. How can you get more vitamin D? Foods that contain vitamin D include:  · Benton, tuna, and mackerel.  These are some of the best foods to eat

## 2021-06-24 NOTE — PROGRESS NOTES
[facility] [address] [place] [address]   O: [phone]  Liz Gore is a 27 y.o. male evaluated via telephone on 6/25/2021. CONSENT:  He and/or health care decision maker is aware that that he may receive a bill for this telephone service, depending on his insurance coverage, and has provided verbal consent to proceed: Yes    DOCUMENTATION:  Patient scheduled this appointment today due to Other (FOLLOW UP )    Patient wants to wait on adipex since he's always tired. GERD  Patient reported symptoms related to GERD. He reports some heartburn, dyspepsia, and indigestion. This has been going on for a while. Patient have used 14-day Prilosec in the past which worked while he was on it. However, he stopped taking it and symptoms are back. Patient is aware of some of the food triggers which he tries to avoid. Food that he noted were pizza he thinks that is mostly the sauce. He tries to avoid spicy food because that is also 1 big trigger. Patient have also tried some Tums in the past which provides very poor relief but continues to use it. No previous evaluation done. Pepcid started on previous visit. Patient reports fair success. Patient states that he still took a few times with specific food triggers. Also noted that he still continues to take Tums occasionally. Patient was offered to get switched to omeprazole but declined. ABDOMINAL PAIN DIARRHEA CONSTIPATION  Patient has a known history of IBS mixed. Patient has been having some issues with diarrhea and constipation for awhile now. Patient felt like it had gotten worse in the past several months. Patient states that he would have diarrhea and continue to take Imodium and then afterwards will have constipation after that. Patient have never had any evaluation done as well. Patient states that he is never noticed any blood in his stools.   He does complain of some abdominal Patient-Reported Pulse 62   Patient-Reported SpO2 99     Constitutional: [x] No apparent distress      [] Abnormal -   Mental status: [x] Alert and awake  [x] Oriented to person/place/time[] Abnormal -   Pulmonary/Chest: [x] Respiratory effort normal         [] Abnormal -          Psychiatric:       [x] Normal Affect [] Abnormal -        [x] No Hallucinations  Other pertinent observable physical exam findings:-none    ASSESSMENT  1. Irritable bowel syndrome with both constipation and diarrhea  Improving  Abdmoninal US ordered  Continue bentyl    - US ABDOMEN LIMITED; Future    2. Gastroesophageal reflux disease, unspecified whether esophagitis present  Failure to Improve  Continue therapy  DISCUSSED AND ADVISED TO:  Avoid food triggers. Stop eating large meals close to bedtime  Don't eat meals too close to bedtime  Avoid ASA, NSAID's, caffeine, peppermints, alcohol and tobacco.  Report for worsening symptoms.    - US ABDOMEN LIMITED; Future    3. Generalized abdominal pain  Failure to Improve  Continue to evaluate    - US ABDOMEN LIMITED; Future    4. Elevated LFTs  Likely stable  Evaluate for fatty liver  - US ABDOMEN LIMITED; Future    5. Elevated serum creatinine  Likely stable  Recheck soon      I affirm this is a Patient Initiated Episode with a Patient who has not had a related appointment within my department in the past 7 days or scheduled within the next 24 hours.     Patient identification was verified at the start of the visit: Yes    Total Time: minutes: 11-20 minutes    Note: not billable if this call serves to triage the patient into an appointment for the relevant concern  Patient-Reported Vitals 6/25/2021   Patient-Reported Weight 260   Patient-Reported Height 5 11   Patient-Reported Systolic 269   Patient-Reported Diastolic 65   Patient-Reported Pulse 62   Patient-Reported SpO2 99     Patient Active Problem List   Diagnosis    Obesity (BMI 35.0-39.9 without comorbidity)    S/P tonsillectomy    S/P medial meniscus repair of right knee    History of COVID-19    Gastroesophageal reflux disease    Irritable bowel syndrome with both constipation and diarrhea    Family history of testicular cancer    Dyslipidemia    Vitamin D deficiency    Elevated serum creatinine     Katy Soliman is a 27 y.o. male patient  being evaluated by a Virtual Visit (video visit) encounter to address concerns as mentioned above. A caregiver was present when appropriate. Due to this being a TeleHealth encounter (During XLQDJ-25 public health emergency), evaluation of the following organ systems was limited:Vitals/Constitutional/EENT/Resp/CV/GI//MS/Neuro/Skin/Heme-Lymph-Imm. Services were provided through a video synchronous discussion virtually to substitute for in-person clinic visit. This is a telehealth visit that was performed with the originating site at Patient Location: home and provider Location of [place], [place]. Pursuant to the emergency declaration under the [address] [place] [address] authority and the [employer] and [employer] Act, this Virtual Visit was conducted with patient's (and/or legal guardian's) consent, to reduce the patient's risk of exposure to COVID-19 and provide necessary medical care. The patient (and/or legal guardian) has also been advised to contact this office for worsening conditions or problems, and seek emergency medical treatment and/or call 911 if deemed necessary. This note was completed by using the assistance of a speech-recognition program. However, inadvertent computerized transcription errors may be present. Although every effort was made to ensure accuracy, no guarantees can be provided that every mistake has been identified and corrected by editing.   Electronically signed by HARDIK Kelly CNP on 6/23/21 at 9:54 PM EDT

## 2021-06-25 ENCOUNTER — TELEMEDICINE (OUTPATIENT)
Dept: FAMILY MEDICINE CLINIC | Age: 26
End: 2021-06-25
Payer: COMMERCIAL

## 2021-06-25 DIAGNOSIS — K21.9 GASTROESOPHAGEAL REFLUX DISEASE, UNSPECIFIED WHETHER ESOPHAGITIS PRESENT: ICD-10-CM

## 2021-06-25 DIAGNOSIS — R79.89 ELEVATED LFTS: ICD-10-CM

## 2021-06-25 DIAGNOSIS — R79.89 ELEVATED SERUM CREATININE: ICD-10-CM

## 2021-06-25 DIAGNOSIS — K58.2 IRRITABLE BOWEL SYNDROME WITH BOTH CONSTIPATION AND DIARRHEA: Primary | ICD-10-CM

## 2021-06-25 DIAGNOSIS — R10.84 GENERALIZED ABDOMINAL PAIN: ICD-10-CM

## 2021-06-25 PROCEDURE — 99213 OFFICE O/P EST LOW 20 MIN: CPT | Performed by: FAMILY MEDICINE

## 2021-06-25 PROCEDURE — G8427 DOCREV CUR MEDS BY ELIG CLIN: HCPCS | Performed by: FAMILY MEDICINE

## 2021-06-25 SDOH — ECONOMIC STABILITY: FOOD INSECURITY: WITHIN THE PAST 12 MONTHS, THE FOOD YOU BOUGHT JUST DIDN'T LAST AND YOU DIDN'T HAVE MONEY TO GET MORE.: NEVER TRUE

## 2021-06-25 SDOH — ECONOMIC STABILITY: TRANSPORTATION INSECURITY
IN THE PAST 12 MONTHS, HAS LACK OF TRANSPORTATION KEPT YOU FROM MEETINGS, WORK, OR FROM GETTING THINGS NEEDED FOR DAILY LIVING?: NO

## 2021-06-25 SDOH — ECONOMIC STABILITY: INCOME INSECURITY: IN THE LAST 12 MONTHS, WAS THERE A TIME WHEN YOU WERE NOT ABLE TO PAY THE MORTGAGE OR RENT ON TIME?: NO

## 2021-06-25 SDOH — ECONOMIC STABILITY: HOUSING INSECURITY
IN THE LAST 12 MONTHS, WAS THERE A TIME WHEN YOU DID NOT HAVE A STEADY PLACE TO SLEEP OR SLEPT IN A SHELTER (INCLUDING NOW)?: NO

## 2021-06-25 SDOH — ECONOMIC STABILITY: TRANSPORTATION INSECURITY
IN THE PAST 12 MONTHS, HAS THE LACK OF TRANSPORTATION KEPT YOU FROM MEDICAL APPOINTMENTS OR FROM GETTING MEDICATIONS?: NO

## 2021-06-25 SDOH — ECONOMIC STABILITY: FOOD INSECURITY: WITHIN THE PAST 12 MONTHS, YOU WORRIED THAT YOUR FOOD WOULD RUN OUT BEFORE YOU GOT MONEY TO BUY MORE.: NEVER TRUE

## 2021-06-25 ASSESSMENT — SOCIAL DETERMINANTS OF HEALTH (SDOH): HOW HARD IS IT FOR YOU TO PAY FOR THE VERY BASICS LIKE FOOD, HOUSING, MEDICAL CARE, AND HEATING?: NOT HARD AT ALL

## 2021-06-25 NOTE — PATIENT INSTRUCTIONS
[facility] COVID-19 Vaccination Hotline: [phone]    COVID-19 vaccine appointments are not available through our practice. As you're eligible to receive the COVID-19 vaccine, as determined by your state's department of health, you will be able to schedule an appointment through [address] or by calling [phone]. Appointments are required to receive the COVID-19 vaccine. As vaccine supply continues to be limited, we anticipate open appointments to fill up quickly and appreciate your patience as we work through the process of providing vaccines to those in our communities. Schedule a Vaccine  When you qualify to receive the vaccine, call the [facility] COVID-19 Vaccination Hotline to schedule your appointment or to get additional information about the [facility] locations which are offering the COVID-19 vaccine. To be 94% effective, it's important that you receive two doses of one of the COVID-19 vaccines. -If you are receiving the Valladares Peter vaccine, your second shot will be scheduled as close to 21 days after the first shot as possible. -If you are receiving the Moderna vaccine, your second shot will be scheduled as close to 28 days after the first shot as possible. Links to [facility]) website and [facility] website:    BelloDB3 Mobile/mercy-[facility]-monitoring-coronavirus-covid-19/covid-19-vaccine/[place]/duran-vaccine    [email or web address]    [facility]  https://sites. google. com/view/wchd[place]-coronavirus/home/vaccines/get-vaccinated  LocalElectrolysis.fi. com/r/[place]_Vaccine_On-Call_List      https://ThinkSuit/shared/[email or web address]?:toolbar=n&:display_count=n&:origin=viz_share_link&:embed=y    PHARMACY LOCATIONS  Https://vaccine. coronavirus. [place].gov/    [facility]  Https://ThinkSuit/shared/[email or web address]?:toolbar=n&:display_count=n&:origin=viz_share_link&:embed=y    [facility]

## 2021-07-21 DIAGNOSIS — K21.9 GASTROESOPHAGEAL REFLUX DISEASE, UNSPECIFIED WHETHER ESOPHAGITIS PRESENT: ICD-10-CM

## 2021-07-21 RX ORDER — FAMOTIDINE 20 MG/1
TABLET, FILM COATED ORAL
Qty: 60 TABLET | Refills: 3 | Status: SHIPPED | OUTPATIENT
Start: 2021-07-21 | End: 2021-11-17

## 2021-07-27 ENCOUNTER — HOSPITAL ENCOUNTER (OUTPATIENT)
Dept: ULTRASOUND IMAGING | Age: 26
Discharge: HOME | End: 2021-07-29
Payer: COMMERCIAL

## 2021-07-27 DIAGNOSIS — K58.2 IRRITABLE BOWEL SYNDROME WITH BOTH CONSTIPATION AND DIARRHEA: ICD-10-CM

## 2021-07-27 DIAGNOSIS — R10.84 GENERALIZED ABDOMINAL PAIN: ICD-10-CM

## 2021-07-27 DIAGNOSIS — R79.89 ELEVATED LFTS: ICD-10-CM

## 2021-07-27 DIAGNOSIS — K21.9 GASTROESOPHAGEAL REFLUX DISEASE, UNSPECIFIED WHETHER ESOPHAGITIS PRESENT: ICD-10-CM

## 2021-07-27 PROBLEM — K76.0 FATTY LIVER: Status: ACTIVE | Noted: 2021-07-27

## 2021-07-27 PROCEDURE — 76705 ECHO EXAM OF ABDOMEN: CPT

## 2021-07-27 NOTE — RESULT ENCOUNTER NOTE
ABNORMAL. Please notify patient. Ultrasound of the liver shows fatty liver  Low carb, low fat diet, increase fruits and vegetables, and exercise 4-5 times a week 30-40 minutes a day, or walk 1-2 hours per day, or wear a pedometer and get at least 10,000 steps per day.   Also avoid pop and alcoholic beverages    Future Appointments  10/25/2021 1:00 PM    HARDIK Burns -* PsychiatricTOP

## 2021-09-17 DIAGNOSIS — K58.2 IRRITABLE BOWEL SYNDROME WITH BOTH CONSTIPATION AND DIARRHEA: ICD-10-CM

## 2021-09-17 RX ORDER — DICYCLOMINE HYDROCHLORIDE 10 MG/1
CAPSULE ORAL
Qty: 360 CAPSULE | Refills: 1 | Status: SHIPPED | OUTPATIENT
Start: 2021-09-17 | End: 2022-03-15

## 2021-10-24 ASSESSMENT — ENCOUNTER SYMPTOMS
CONSTIPATION: 1
ABDOMINAL PAIN: 1
SHORTNESS OF BREATH: 0
RESPIRATORY NEGATIVE: 1
DIARRHEA: 1

## 2021-10-24 NOTE — PROGRESS NOTES
[facility] PHYSICIANS  [facility] [address] [address]  [address] [address]  Dept: Girish Asif (:  1995) is a 27 y.o. male,Established patient, here for evaluation of the following chief complaint(s):  Chief Complaint   Patient presents with    6 Month Follow-Up    Discuss Labs    Other     NO CONCERNS/ STATES CALLED OFF WORK YESTERDAY DUE TO PAIN    Immunizations     NO TO COVID-19/FLU VACCINE        SUBJECTIVE/OBJECTIVE:  CADENCE Phan is a 27 y.o. male patient. Patient is an established patient of [facility]. Patient is also here to review blood work results. GERD  Patient reported a chronic symptoms related to GERD. He reports some heartburn, dyspepsia, and indigestion. This has been going on for a while antacid. Patient have used 14-day Prilosec in the past which worked while he was on it. However, he stopped taking it and symptoms are back. Patient is aware of some of the food triggers which he tries to avoid. Food that he noted were pizza he thinks that is mostly the minor sauce. He tries to avoid spicy food because that is also 1 big trigger. Patient have also tried some Tums in the past which provides very poor relief but continues to use it. No previous evaluation done. Pepcid started on previous visit. Patient reports fair success. Patient states that he still took a few times with specific food triggers. Heartburn going away. ABDOMINAL PAIN DIARRHEA CONSTIPATION/FATTY LIVER  Patient has a known history of IBS mixed. Patient has been having some issues with diarrhea and constipation for awhile now. PREVIOUS NOTES   Patient felt like it had gotten worse in the past 2 months. Patient states that he would have diarrhea and continue to take Imodium and then afterwards will have constipation after that. Patient have never had any evaluation done as well. Patient states that he is never noticed any blood in his stools.   He does complain of some abdominal cramps. Patient started on Pepcid-Bentyl- regularly. He has never been to the any emergency room or urgent care for any evaluation for any of this. Patient had tried taking probiotics which provides very mild relief. He is aware that he is probably going to need to add a little bit more fiber to his diet. Patient reports great success with Bentyl. Depends on what he eats- hurts more. Chicken- cottage cheese. Debbie Coates is currently on will start Pravastatin. Patient denies adverse reaction to this medication. Compliance with treatment thus far has been good. The patient is not known to have coexisting coronary artery disease. The CVD Risk score (EUGENIA'Agoino, et al., 2008) failed to calculate for the following reasons: The 2008 CVD risk score is only valid for ages 27 to 76  Lab Results   Component Value Date/Time    CHOLFAST 195 03/29/2021 11:38 AM    CHOL 168 07/20/2016 10:44 AM    HDL 43 03/29/2021 11:38 AM    LDLCHOLESTEROL 132 (H) 03/29/2021 11:38 AM    TRIGLYCFAST 102 03/29/2021 11:38 AM    CHOLHDLRATIO 4.5 03/29/2021 11:38 AM    TRIG 84 07/20/2016 10:44 AM    VLDL NOT REPORTED 03/29/2021 11:38 AM     PREDIABETES  Patient's recent hemoglobin A1c below. Patient admits to poor eating habits. Patient denies any polyphagia, polydipsia, polyuria. We discussed the importance lifestyle changes such as cutting down food/drinks high in carbohydrates and regular exercise to avoid any progression on this condition. We are going to continue to monitor the [facility]. Treatment none. Lab Results   Component Value Date    LABA1C 5.4 03/29/2021      OBESITY  Patient's BMI is Body mass index is 34.98 kg/m². kg/m2. BMI is decreasing. Patient understands that this condition increases the patient's risk for chronic conditions.   Wt Readings from Last 3 Encounters:   10/25/21 250 lb 12.8 oz (113.8 kg)   04/26/21 261 lb (118.4 kg)   03/29/21 261 lb (118.4 kg)     Lost 11 pounds- T 25 - 5 day 25 minutes- watch 10,000 steps   Familial history of diabetes. -diet: We will continue to encourage. Discussed Mediterranean diet, and macros diet  -exercise:T 12 started- Weights at work. Patient declined to use Adipex at this time    LFT's- / FATTY LIVER  Mild elevation. Reminded to cut down alcohol use  Lab Results   Component Value Date/Time    ALKPHOS 70 03/29/2021 11:38 AM    ALT 43 (H) 03/29/2021 11:38 AM    AST 28 03/29/2021 11:38 AM    BILITOT 0.53 03/29/2021 11:38 AM    PROT 7.7 03/29/2021 11:38 AM    LABALBU 4.8 03/29/2021 11:38 AM      CHRONIC KIDNEY DISEASE  Dipesh had elevation on his creatinine level. Adv to drink lots of fluids cut down salt intake and OTC NSAIDS. Continue to monitor. Creatinine level improved from work lab. Lab Results   Component Value Date/Time    K 4.6 03/29/2021 11:38 AM    BUN 18 03/29/2021 11:38 AM    CREATININE 1.27 (H) 03/29/2021 11:38 AM    LABGLOM >60 03/29/2021 11:38 AM    GFRAA >60 03/29/2021 11:38 AM      Ella Hightower  is due for Tetanus Diphtheria vaccination. Patient's last dose of TD was unknown.  he denies any previous adverse reaction to this type of vaccine. Vitals:    10/25/21 1301   BP: 128/86   Pulse: 77   Temp: 98 °F (36.7 °C)   SpO2: 99%   Weight: 250 lb 12.8 oz (113.8 kg)   Height: 5' 11\" (1.803 m)   Estimated body mass index is 34.98 kg/m² as calculated from the following:    Height as of this encounter: 5' 11\" (1.803 m). Weight as of this encounter: 250 lb 12.8 oz (113.8 kg). Review of Systems   Constitutional: Positive for unexpected weight change (losing weight). Negative for activity change, chills and fever. HENT: Negative. Respiratory: Negative. Negative for shortness of breath. Cardiovascular: Negative. Negative for chest pain and palpitations. Gastrointestinal: Positive for abdominal pain, constipation and diarrhea. Heartburn-improved   Endocrine: Negative.     Genitourinary: Negative for dysuria. Musculoskeletal: Negative for arthralgias and myalgias. Skin: Negative for rash. Neurological: Negative for headaches. Psychiatric/Behavioral: Negative for sleep disturbance and suicidal ideas. The patient is nervous/anxious. Physical Exam  Vitals and nursing note reviewed. Constitutional:       Appearance: He is well-developed. He is obese. HENT:      Head: Normocephalic. Right Ear: External ear normal.      Left Ear: External ear normal.      Nose: Nose normal.      Mouth/Throat:      Mouth: Mucous membranes are moist.   Eyes:      Pupils: Pupils are equal, round, and reactive to light. Cardiovascular:      Rate and Rhythm: Normal rate and regular rhythm. Pulses: Normal pulses. Heart sounds: Normal heart sounds. Pulmonary:      Effort: Pulmonary effort is normal. No respiratory distress. Breath sounds: Normal breath sounds. Abdominal:      General: Abdomen is protuberant. Bowel sounds are normal. There is no distension. Palpations: Abdomen is soft. Tenderness: There is no abdominal tenderness. Comments: obese   Musculoskeletal:         General: Normal range of motion. Cervical back: Normal range of motion and neck supple. Skin:     General: Skin is warm and dry. Capillary Refill: Capillary refill takes less than 2 seconds. Neurological:      Mental Status: He is alert and oriented to person, place, and time. Psychiatric:         Mood and Affect: Mood is anxious. Speech: Speech is not rapid and pressured. Behavior: Behavior normal.         Thought Content: Thought content does not include homicidal or suicidal ideation. Diagnosis Date    Fatty liver 7/27/2021      Prior to Visit Medications    Medication Sig Taking?  Authorizing Provider   pravastatin (PRAVACHOL) 20 MG tablet Take 1 tablet by mouth daily Yes Richa Matthew APRN - CNP   dicyclomine (BENTYL) 10 MG capsule take 1 capsule by mouth four times a day Yes HARDIK Burns CNP   famotidine (PEPCID) 20 MG tablet take 1 tablet by mouth twice a day Yes HARDIK Burns CNP   vitamin D (CHOLECALCIFEROL) 25 MCG (1000 UT) TABS tablet Take 1 tablet by mouth daily  HARDIK Burns CNP   ibuprofen (ADVIL;MOTRIN) 800 MG tablet Take 800 mg by mouth every 6 hours as needed for Pain  Patient not taking: Reported on 10/25/2021  Historical Provider, MD       ASSESSMENT/PLAN:  1. Mixed hyperlipidemia  Stable  Continue therapy. Advised to decrease the consumption of red meats, fried foods, trans fats, sweets, sugary beverages. Advised to increase fish, vegetables, and fruits consumption. Advised to add fiber or OTC supplements in diet. Discussed weight loss which will result in improvement of lipids levels. Advised to increase daily physical activities and add regular exercises. - pravastatin (PRAVACHOL) 20 MG tablet; Take 1 tablet by mouth daily  Dispense: 90 tablet; Refill: 1    2. Prediabetes  Stable  DISCUSSED and ADVISED TO:  Decrease carbohydrates, sugary drinks, desserts   Exercise regularly, as tolerated. Try to lose weight. 3. Irritable bowel syndrome with both constipation and diarrhea  Stable  DISCUSSED and ADVISED TO:  Drink plenty of fluids, enough until urine is light yellow or clear like water. Choose water and other caffeine-free clear liquids. If you do not feel like eating or drinking, try taking small sips of water, sports drinks, or other rehydration drinks. Get plenty of rest.  Go to the Emergency Room if you are not able to tolerate any food or water. 4. Gastroesophageal reflux disease, unspecified whether esophagitis present  Stable  Continue therapy  DISCUSSED AND ADVISED TO:  Avoid food triggers. Stop eating large meals close to bedtime  Don't eat meals too close to bedtime  Avoid ASA, NSAID's, caffeine, peppermints, alcohol and tobacco.  Report for worsening symptoms.       5. Fatty liver  Stable  Continue to monitor      6. Obesity (BMI 35.0-39.9 without comorbidity)  BMI decreasing  DISCUSSED AND ADVISED TO:  Eat a low-fat and low carbohydrates diet. Avoid fried foods especially fast food. Choose healthier options for snacks. Have 5-6 servings of fruits and vegetables per day. Cut down on eating processed food. Add 30 minutes to 1 hour aerobic exercise for 3-4 days a week. 7. Need for Tdap vaccination  Recommended by CDC. No current infection. Denies previous adverse reaction to vaccination.      - Tdap (age 6y and older) IM (Boostrix)      Return in about 4 months (around 2/25/2022) for Chronic conditions, 30mins. This note was completed by using the assistance of a speech-recognition program. However, inadvertent computerized transcription errors may be present. Although every effort was made to ensure accuracy, no guarantees can be provided that every mistake has been identified and corrected by editing.   Electronically signed by HARDIK Mendosa CNP on 3/27/21 at 11:29 PM EDT     --HARDIK Mendosa CNP

## 2021-10-25 ENCOUNTER — OFFICE VISIT (OUTPATIENT)
Dept: FAMILY MEDICINE CLINIC | Age: 26
End: 2021-10-25
Payer: COMMERCIAL

## 2021-10-25 VITALS
HEIGHT: 71 IN | BODY MASS INDEX: 35.11 KG/M2 | HEART RATE: 77 BPM | DIASTOLIC BLOOD PRESSURE: 86 MMHG | WEIGHT: 250.8 LBS | TEMPERATURE: 98 F | SYSTOLIC BLOOD PRESSURE: 128 MMHG | OXYGEN SATURATION: 99 %

## 2021-10-25 DIAGNOSIS — K58.2 IRRITABLE BOWEL SYNDROME WITH BOTH CONSTIPATION AND DIARRHEA: ICD-10-CM

## 2021-10-25 DIAGNOSIS — K76.0 FATTY LIVER: ICD-10-CM

## 2021-10-25 DIAGNOSIS — R73.03 PREDIABETES: ICD-10-CM

## 2021-10-25 DIAGNOSIS — E78.2 MIXED HYPERLIPIDEMIA: Primary | ICD-10-CM

## 2021-10-25 DIAGNOSIS — K21.9 GASTROESOPHAGEAL REFLUX DISEASE, UNSPECIFIED WHETHER ESOPHAGITIS PRESENT: ICD-10-CM

## 2021-10-25 DIAGNOSIS — E66.9 OBESITY (BMI 35.0-39.9 WITHOUT COMORBIDITY): ICD-10-CM

## 2021-10-25 DIAGNOSIS — Z23 NEED FOR TDAP VACCINATION: ICD-10-CM

## 2021-10-25 PROCEDURE — G8427 DOCREV CUR MEDS BY ELIG CLIN: HCPCS | Performed by: FAMILY MEDICINE

## 2021-10-25 PROCEDURE — G8484 FLU IMMUNIZE NO ADMIN: HCPCS | Performed by: FAMILY MEDICINE

## 2021-10-25 PROCEDURE — 90471 IMMUNIZATION ADMIN: CPT | Performed by: FAMILY MEDICINE

## 2021-10-25 PROCEDURE — 99214 OFFICE O/P EST MOD 30 MIN: CPT | Performed by: FAMILY MEDICINE

## 2021-10-25 PROCEDURE — G8417 CALC BMI ABV UP PARAM F/U: HCPCS | Performed by: FAMILY MEDICINE

## 2021-10-25 PROCEDURE — 90715 TDAP VACCINE 7 YRS/> IM: CPT | Performed by: FAMILY MEDICINE

## 2021-10-25 PROCEDURE — 1036F TOBACCO NON-USER: CPT | Performed by: FAMILY MEDICINE

## 2021-10-25 RX ORDER — PRAVASTATIN SODIUM 20 MG
20 TABLET ORAL DAILY
Qty: 90 TABLET | Refills: 1 | Status: SHIPPED | OUTPATIENT
Start: 2021-10-25 | End: 2022-04-15

## 2021-10-25 ASSESSMENT — PATIENT HEALTH QUESTIONNAIRE - PHQ9
SUM OF ALL RESPONSES TO PHQ QUESTIONS 1-9: 0
1. LITTLE INTEREST OR PLEASURE IN DOING THINGS: 0
SUM OF ALL RESPONSES TO PHQ QUESTIONS 1-9: 0
SUM OF ALL RESPONSES TO PHQ QUESTIONS 1-9: 0
SUM OF ALL RESPONSES TO PHQ9 QUESTIONS 1 & 2: 0
2. FEELING DOWN, DEPRESSED OR HOPELESS: 0

## 2021-10-25 NOTE — PROGRESS NOTES
Visit Information    Have you changed or started any medications since your last visit including any over-the-counter medicines, vitamins, or herbal medicines? no   Have you stopped taking any of your medications? Is so, why? -  no  Are you having any side effects from any of your medications? - no    Have you seen any other physician or provider since your last visit?  no   Have you had any other diagnostic tests since your last visit? yes -    Have you been seen in the emergency room and/or had an admission in a hospital since we last saw you?  no   Have you had your routine dental cleaning in the past 6 months?  no     Do you have an active MyChart account? If no, what is the barrier?   Yes    Patient Care Team:  HARDIK Burns CNP as PCP - General (Family Medicine)  HARDIK Burns CNP as PCP - [facility] Tyrese Benz Provider    Medical History Review  Past Medical, Family, and Social History reviewed and does contribute to the patient presenting condition    Health Maintenance   Topic Date Due    COVID-19 Vaccine (1) Never done    DTaP/Tdap/Td vaccine (1 - Tdap) Never done    Flu vaccine (1) Never done    Hepatitis C screen  Completed    HIV screen  Completed    Hepatitis A vaccine  Aged Out    Hepatitis B vaccine  Aged Out    Hib vaccine  Aged Out    Meningococcal (ACWY) vaccine  Aged Out    Pneumococcal 0-64 years Vaccine  Aged Out    Varicella vaccine  Discontinued

## 2021-10-25 NOTE — PATIENT INSTRUCTIONS
Patient Education        Learning About the Mediterranean Diet  What is the [address]? The Mediterranean diet is a style of eating rather than a diet plan. It features foods eaten in [place], [place], [place] and [place], and other countries along the [facility]. It emphasizes eating foods like fish, fruits, vegetables, beans, high-fiber breads and whole grains, nuts, and olive oil. This style of eating includes limited red meat, cheese, and sweets. Why choose the Mediterranean diet? A Mediterranean-style diet may improve heart health. It contains more fat than other heart-healthy diets. But the fats are mainly from nuts, unsaturated oils (such as fish oils and olive oil), and certain nut or seed oils (such as canola, soybean, or flaxseed oil). These fats may help protect the heart and blood vessels. How can you get started on the Mediterranean diet? Here are some things you can do to switch to a more Mediterranean way of eating. What to eat  · Eat a variety of fruits and vegetables each day, such as grapes, blueberries, tomatoes, broccoli, peppers, figs, olives, spinach, eggplant, beans, lentils, and chickpeas. · Eat a variety of whole-grain foods each day, such as oats, brown rice, and whole wheat bread, pasta, and couscous. · Eat fish at least 2 times a week. Try tuna, salmon, mackerel, lake trout, herring, or sardines. · Eat moderate amounts of low-fat dairy products, such as milk, cheese, or yogurt. · Eat moderate amounts of poultry and eggs. · Choose healthy (unsaturated) fats, such as nuts, olive oil, and certain nut or seed oils like canola, soybean, and flaxseed. · Limit unhealthy (saturated) fats, such as butter, palm oil, and coconut oil. And limit fats found in animal products, such as meat and dairy products made with whole milk. Try to eat red meat only a few times a month in very small amounts. · Limit sweets and desserts to only a few times a week.  This includes sugar-sweetened drinks like soda. The Mediterranean diet may also include red wine with your meal1 glass each day for women and up to 2 glasses a day for men. Tips for eating at home  · Use herbs, spices, garlic, lemon zest, and citrus juice instead of salt to add flavor to foods. · Add avocado slices to your sandwich instead of palacios. · Have fish for lunch or dinner instead of red meat. Brush the fish with olive oil, and broil or grill it. · Sprinkle your salad with seeds or nuts instead of cheese. · Cook with olive or canola oil instead of butter or oils that are high in saturated fat. · Switch from 2% milk or whole milk to 1% or fat-free milk. · Dip raw vegetables in a vinaigrette dressing or hummus instead of dips made from mayonnaise or sour cream.  · Have a piece of fruit for dessert instead of a piece of cake. Try baked apples, or have some dried fruit. Tips for eating out  · Try broiled, grilled, baked, or poached fish instead of having it fried or breaded. · Ask your  to have your meals prepared with olive oil instead of butter. · Order dishes made with marinara sauce or sauces made from olive oil. Avoid sauces made from cream or mayonnaise. · Choose whole-grain breads, whole wheat pasta and pizza crust, brown rice, beans, and lentils. · Cut back on butter or margarine on bread. Instead, you can dip your bread in a small amount of olive oil. · Ask for a side salad or grilled vegetables instead of french fries or chips. Where can you learn more? Go to [email or web address]://[email or web address] [email or web address] org and sign in to your [employer] account. Enter [phone] in the [facility] box to learn more about \"Learning About the Mediterranean Diet. \"     If you do not have an account, please click on the \"Sign Up Now\" link. Current as of: December 17, 2020               Content Version: 13.0  © 0163-3089 Healthwise, [employer]. Care instructions adapted under license by [facility] ([facility]).  If you have questions about a medical condition or this instruction, always ask your healthcare professional. [place] [address] any warranty or liability for your use of this information.

## 2021-11-17 DIAGNOSIS — K21.9 GASTROESOPHAGEAL REFLUX DISEASE, UNSPECIFIED WHETHER ESOPHAGITIS PRESENT: ICD-10-CM

## 2021-11-17 RX ORDER — FAMOTIDINE 20 MG/1
TABLET, FILM COATED ORAL
Qty: 60 TABLET | Refills: 3 | Status: SHIPPED | OUTPATIENT
Start: 2021-11-17 | End: 2022-03-15

## 2022-02-22 ASSESSMENT — ENCOUNTER SYMPTOMS
DIARRHEA: 1
CONSTIPATION: 1
RESPIRATORY NEGATIVE: 1
ABDOMINAL PAIN: 1
SHORTNESS OF BREATH: 0

## 2022-02-23 NOTE — PROGRESS NOTES
[address] [facility] [address]  [address] [facility] [id number]  Dept: Girish Asif (:  1995) is a 32 y.o. male,Established patient, here for evaluation of the following chief complaint(s):  No chief complaint on file. SUBJECTIVE/OBJECTIVE:  CADENCE Lovett is a 32 y.o. male patient. Patient is an established patient of mine. Patient is also here to review blood work results. GERD  Patient reported a chronic symptoms related to GERD. He reports some heartburn, dyspepsia, and indigestion. This has been going on for a while antacid. Patient have used 14-day Prilosec in the past which worked while he was on it. However, he stopped taking it and symptoms are back. Patient is aware of some of the food triggers which he tries to avoid. Food that he noted were pizza he thinks that is mostly the minor sauce. He tries to avoid spicy food because that is also 1 big trigger. Patient have also tried some Tums in the past which provides very poor relief but continues to use it. No previous evaluation done. Pepcid started on previous visit. Patient reports fair success. Patient states that he still took a few times with specific food triggers. Heartburn going away. ABDOMINAL PAIN DIARRHEA CONSTIPATION/FATTY LIVER  Patient has a known history of IBS mixed. Patient has been having some issues with diarrhea and constipation for awhile now. PREVIOUS NOTES   Patient felt like it had gotten worse in the past 2 months. Patient states that he would have diarrhea and continue to take Imodium and then afterwards will have constipation after that. Patient have never had any evaluation done as well. Patient states that he is never noticed any blood in his stools. He does complain of some abdominal cramps. Patient started on Pepcid-Bentyl- regularly.  He has never been to the any emergency room or urgent care for any evaluation for any of this.  Patient had tried taking probiotics which provides very mild relief. He is aware that he is probably going to need to add a little bit more fiber to his diet. Patient reports great success with Bentyl. Depends on what he eats- hurts more. Chicken- cottage cheese. Edgar Paez is currently on will start Pravastatin. Patient denies adverse reaction to this medication. Compliance with treatment thus far has been good. The patient is not known to have coexisting coronary artery disease. The CVD Risk score (EUGENIA'Agoino, et al., 2008) failed to calculate for the following reasons: The 2008 CVD risk score is only valid for ages 27 to 76  Lab Results   Component Value Date/Time    CHOLFAST 195 03/29/2021 11:38 AM    CHOL 168 07/20/2016 10:44 AM    HDL 43 03/29/2021 11:38 AM    LDLCHOLESTEROL 132 (H) 03/29/2021 11:38 AM    TRIGLYCFAST 102 03/29/2021 11:38 AM    CHOLHDLRATIO 4.5 03/29/2021 11:38 AM    TRIG 84 07/20/2016 10:44 AM    VLDL NOT REPORTED 03/29/2021 11:38 AM     PREDIABETES  Patient's recent hemoglobin A1c below. Patient admits to poor eating habits. Patient denies any polyphagia, polydipsia, polyuria. We discussed the importance lifestyle changes such as cutting down food/drinks high in carbohydrates and regular exercise to avoid any progression on this condition. We are going to continue to monitor the [facility]. Treatment none. Lab Results   Component Value Date    LABA1C 5.4 03/29/2021      OBESITY  Patient's BMI is There is no height or weight on file to calculate BMI.  kg/m2. BMI is decreasing. Patient understands that this condition increases the patient's risk for chronic conditions. Wt Readings from Last 3 Encounters:   10/25/21 250 lb 12.8 oz (113.8 kg)   04/26/21 261 lb (118.4 kg)   03/29/21 261 lb (118.4 kg)     Lost 11 pounds- T 25 - 5 day 25 minutes- watch 10,000 steps   Familial history of diabetes. -diet: We will continue to encourage.   Discussed Mediterranean diet, and macros diet  -exercise:T 12 started- Weights at work. Patient declined to use Adipex at this time    LFT's- / FATTY LIVER  Mild elevation. Reminded to cut down alcohol use  Lab Results   Component Value Date/Time    ALKPHOS 70 03/29/2021 11:38 AM    ALT 43 (H) 03/29/2021 11:38 AM    AST 28 03/29/2021 11:38 AM    BILITOT 0.53 03/29/2021 11:38 AM    PROT 7.7 03/29/2021 11:38 AM    LABALBU 4.8 03/29/2021 11:38 AM      CHRONIC KIDNEY DISEASE  Dipesh had elevation on his creatinine level. Adv to drink lots of fluids cut down salt intake and OTC NSAIDS. Continue to monitor. Creatinine level improved from work lab. Lab Results   Component Value Date/Time    K 4.6 03/29/2021 11:38 AM    BUN 18 03/29/2021 11:38 AM    CREATININE 1.27 (H) 03/29/2021 11:38 AM    LABGLOM >60 03/29/2021 11:38 AM    GFRAA >60 03/29/2021 11:38 AM      Lisa Garza  is due for Tetanus Diphtheria vaccination. Patient's last dose of TD was unknown.  he denies any previous adverse reaction to this type of vaccine. There were no vitals filed for this visit. Estimated body mass index is 34.98 kg/m² as calculated from the following:    Height as of 10/25/21: 5' 11\" (1.803 m). Weight as of 10/25/21: 250 lb 12.8 oz (113.8 kg). Review of Systems   Constitutional: Positive for unexpected weight change (losing weight). Negative for activity change, chills and fever. HENT: Negative. Respiratory: Negative. Negative for shortness of breath. Cardiovascular: Negative. Negative for chest pain and palpitations. Gastrointestinal: Positive for abdominal pain, constipation and diarrhea. Heartburn-improved   Endocrine: Negative. Genitourinary: Negative for dysuria. Musculoskeletal: Negative for arthralgias and myalgias. Skin: Negative for rash. Neurological: Negative for headaches. Psychiatric/Behavioral: Negative for sleep disturbance and suicidal ideas. The patient is nervous/anxious.         Physical Exam  Vitals and nursing note reviewed. Constitutional:       Appearance: He is well-developed. He is obese. HENT:      Head: Normocephalic. Right Ear: External ear normal.      Left Ear: External ear normal.      Nose: Nose normal.      Mouth/Throat:      Mouth: Mucous membranes are moist.   Eyes:      Pupils: Pupils are equal, round, and reactive to light. Cardiovascular:      Rate and Rhythm: Normal rate and regular rhythm. Pulses: Normal pulses. Heart sounds: Normal heart sounds. Pulmonary:      Effort: Pulmonary effort is normal. No respiratory distress. Breath sounds: Normal breath sounds. Abdominal:      General: Abdomen is protuberant. Bowel sounds are normal. There is no distension. Palpations: Abdomen is soft. Tenderness: There is no abdominal tenderness. Comments: obese   Musculoskeletal:         General: Normal range of motion. Cervical back: Normal range of motion and neck supple. Skin:     General: Skin is warm and dry. Capillary Refill: Capillary refill takes less than 2 seconds. Neurological:      Mental Status: He is alert and oriented to person, place, and time. Psychiatric:         Mood and Affect: Mood is anxious. Speech: Speech is not rapid and pressured. Behavior: Behavior normal.         Thought Content: Thought content does not include homicidal or suicidal ideation. Diagnosis Date    Fatty liver 7/27/2021      Prior to Visit Medications    Medication Sig Taking?  Authorizing Provider   famotidine (PEPCID) 20 MG tablet take 1 tablet by mouth twice a day  HARDIK Burns CNP   pravastatin (PRAVACHOL) 20 MG tablet Take 1 tablet by mouth daily  HARDIK Burns CNP   dicyclomine (BENTYL) 10 MG capsule take 1 capsule by mouth four times a day  HARDIK Burns CNP   vitamin D (CHOLECALCIFEROL) 25 MCG (1000 UT) TABS tablet Take 1 tablet by mouth daily  HARDIK Burns CNP   ibuprofen (ADVIL;MOTRIN) 800 MG tablet Take 800 mg by mouth every 6 hours as needed for Pain  Patient not taking: Reported on 10/25/2021  Historical Provider, MD       ASSESSMENT/PLAN:  1. Mixed hyperlipidemia  Stable  Continue therapy. Advised to decrease the consumption of red meats, fried foods, trans fats, sweets, sugary beverages. Advised to increase fish, vegetables, and fruits consumption. Advised to add fiber or OTC supplements in diet. Discussed weight loss which will result in improvement of lipids levels. Advised to increase daily physical activities and add regular exercises. - pravastatin (PRAVACHOL) 20 MG tablet; Take 1 tablet by mouth daily  Dispense: 90 tablet; Refill: 1    2. Prediabetes  Stable  DISCUSSED and ADVISED TO:  Decrease carbohydrates, sugary drinks, desserts   Exercise regularly, as tolerated. Try to lose weight. 3. Irritable bowel syndrome with both constipation and diarrhea  Stable  DISCUSSED and ADVISED TO:  Drink plenty of fluids, enough until urine is light yellow or clear like water. Choose water and other caffeine-free clear liquids. If you do not feel like eating or drinking, try taking small sips of water, sports drinks, or other rehydration drinks. Get plenty of rest.  Go to the Emergency Room if you are not able to tolerate any food or water. 4. Gastroesophageal reflux disease, unspecified whether esophagitis present  Stable  Continue therapy  DISCUSSED AND ADVISED TO:  Avoid food triggers. Stop eating large meals close to bedtime  Don't eat meals too close to bedtime  Avoid ASA, NSAID's, caffeine, peppermints, alcohol and tobacco.  Report for worsening symptoms. 5. Fatty liver  Stable  Continue to monitor      6. Obesity (BMI 35.0-39.9 without comorbidity)  BMI decreasing  DISCUSSED AND ADVISED TO:  Eat a low-fat and low carbohydrates diet. Avoid fried foods especially fast food. Choose healthier options for snacks.   Have 5-6 servings of fruits and vegetables per day. Cut down on eating processed food. Add 30 minutes to 1 hour aerobic exercise for 3-4 days a week. 7. Need for Tdap vaccination  Recommended by CDC. No current infection. Denies previous adverse reaction to vaccination.      - Tdap (age 6y and older) IM (Boostrix)      No follow-ups on file. This note was completed by using the assistance of a speech-recognition program. However, inadvertent computerized transcription errors may be present. Although every effort was made to ensure accuracy, no guarantees can be provided that every mistake has been identified and corrected by editing.   Electronically signed by HARDIK Kelly CNP on 3/27/21 at 11:29 PM EDT     --HARDIK Kelly CNP

## 2022-02-25 ENCOUNTER — TELEMEDICINE (OUTPATIENT)
Dept: FAMILY MEDICINE CLINIC | Age: 27
End: 2022-02-25
Payer: COMMERCIAL

## 2022-02-25 VITALS — BODY MASS INDEX: 32.78 KG/M2 | WEIGHT: 235 LBS

## 2022-02-25 DIAGNOSIS — R79.89 INCREASE IN CREATININE: ICD-10-CM

## 2022-02-25 DIAGNOSIS — E78.2 MIXED HYPERLIPIDEMIA: ICD-10-CM

## 2022-02-25 DIAGNOSIS — F43.22 ADJUSTMENT DISORDER WITH ANXIOUS MOOD: Primary | ICD-10-CM

## 2022-02-25 DIAGNOSIS — K58.2 IRRITABLE BOWEL SYNDROME WITH BOTH CONSTIPATION AND DIARRHEA: ICD-10-CM

## 2022-02-25 DIAGNOSIS — A60.1 HERPES SIMPLEX INFECTION OF PERIANAL SKIN: ICD-10-CM

## 2022-02-25 DIAGNOSIS — R73.03 PREDIABETES: ICD-10-CM

## 2022-02-25 DIAGNOSIS — E66.9 OBESITY (BMI 35.0-39.9 WITHOUT COMORBIDITY): ICD-10-CM

## 2022-02-25 DIAGNOSIS — K76.0 FATTY LIVER: ICD-10-CM

## 2022-02-25 DIAGNOSIS — E55.9 VITAMIN D DEFICIENCY: ICD-10-CM

## 2022-02-25 DIAGNOSIS — K21.9 GASTROESOPHAGEAL REFLUX DISEASE, UNSPECIFIED WHETHER ESOPHAGITIS PRESENT: ICD-10-CM

## 2022-02-25 DIAGNOSIS — R73.9 HYPERGLYCEMIA: ICD-10-CM

## 2022-02-25 PROCEDURE — 99214 OFFICE O/P EST MOD 30 MIN: CPT | Performed by: FAMILY MEDICINE

## 2022-02-25 PROCEDURE — 81003 URINALYSIS AUTO W/O SCOPE: CPT | Performed by: FAMILY MEDICINE

## 2022-02-25 PROCEDURE — G8427 DOCREV CUR MEDS BY ELIG CLIN: HCPCS | Performed by: FAMILY MEDICINE

## 2022-02-25 NOTE — PATIENT INSTRUCTIONS
Patient Education        Genital Herpes: Care Instructions  Your Care Instructions  Genital herpes is a sexually transmitted infection (STI). The most common way to get it is through sexual or other physical contact with someone who has herpes. Genital herpes is caused by a virus called herpes simplex. There are two types of this virus. Type 2 is the type that usually causes genital herpes. But type 1 can also cause it. Type 1 is the type that causes cold sores. Some people are surprised to find out that they have herpes or that they gave it to someone else. This is because a lot of people who have it don't know that they have it. They may not get sores or they may have sores that they can't see. There is no cure for herpes. But antiviral medicine can help you feel better and help prevent more outbreaks. This medicine may also lower the chance of spreading the virus. Follow-up care is a key part of your treatment and safety. Be sure to make and go to all appointments, and call your doctor if you are having problems. It's also a good idea to know your test results and keep a list of the medicines you take. How can you care for yourself at home? · Be safe with medicines. Take your medicines exactly as directed. Call your doctor if you think you're having a problem with your medicine. You'll get more details on the specific medicines your doctor prescribes. · To reduce the pain and itching from herpes sores:  ? Take warm sitz baths. ? Keep the sores clean and dry in between baths or showers. You can let the sores air-dry. This may feel better than using a towel. ? Wear cotton underwear. Cotton absorbs moisture well. ? Try pouring warm water over the area while urinating. This can help prevent urine from irritating the sores. ? Take an over-the-counter pain medicine, such as acetaminophen (Tylenol), ibuprofen (Advil, Motrin), or naproxen (Aleve). Read and follow all instructions on the label.  Do not take two or more pain medicines at the same time unless the doctor told you to. Many pain medicines have acetaminophen, which is Tylenol. Too much acetaminophen (Tylenol) can be harmful. How can you prevent it? It's easier to prevent an STI than it is to treat one:  · Limit your sex partners. The safest sex is with one partner who has sex only with you. · Talk with your partner or partners about STIs before you have sex. Find out if they are at risk for an STI. Remember that it's possible to have an STI and not know it. · Wait to have sex with new partners until you've each been tested. · Don't have sex if you have symptoms of an infection or if you are being treated for an STI. · Use a condom (a male or female condom) every time you have sex. Condoms are the only form of birth control that also helps prevent STIs. · If you're pregnant, be extra careful. Some STIs can be passed to your baby during delivery. Vaccines are available for some STIs, such as HPV. Ask your doctor for more information. When should you call for help? Call your doctor now or seek immediate medical care if:    · You have a new fever.     · There is increasing redness or red streaks around herpes sores. Watch closely for changes in your health, and be sure to contact your doctor if:    · You have herpes and you think you might be pregnant.     · You have an outbreak of herpes sores, and the sores are not healing.     · You have frequent outbreaks of genital herpes sores.     · You are unable to pass urine or are constipated.     · You want to start antiviral medicine.     · You do not get better as expected. Where can you learn more? Go to [email or web address] and sign in to your [employer] account. Enter U097 in the [employer] box to learn more about \"Genital Herpes: Care Instructions. \"     If you do not have an account, please click on the \"Sign Up Now\" link.   Current as of: February 11, 2021               Content Version: 13.1  © 5065-7726 [employer]. Care instructions adapted under license by [facility] ([facility]). If you have questions about a medical condition or this instruction, always ask your healthcare professional. [address] any warranty or liability for your use of this information.

## 2022-02-25 NOTE — PROGRESS NOTES
Hiram Moreau is a 32 y.o. male evaluated via telephone on 2/25/2022. Consent:  He and/or health care decision maker is aware that that he may receive a bill for this telephone service, which includes applicable co-pays, depending on his insurance coverage, and has provided verbal consent to proceed. Documentation:  I communicated with the patient and/or health care decision maker about   Chief Complaint   Patient presents with    Other     chronic condition follow up     [address] [place] [address]  Patient scheduled this appointment today for a routine follow-up on his chronic conditions. 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 Vito Pimentel Had a miscarriage/twins babies would have been due  Monday 1 [facility]  Reported that doc covid in November. She inadvertently had developed labialis herpes. Patient himself eventually developed perianal herpes himself. Patient was evaluated in urgent care and was treated with acyclovir for 10 days. Patient did well and reported being in remission. INCREASING CREATININE  Dipesh had an increase in creatinine, had some ibuprofen use occasionally. Lab Results   Component Value Date/Time    K 4.6 03/29/2021 11:38 AM    BUN 18 03/29/2021 11:38 AM    CREATININE 1.27 (H) 03/29/2021 11:38 AM    LABGLOM >60 03/29/2021 11:38 AM    GFRAA >60 03/29/2021 11:38 AM      GERD  Patient reported a chronic symptoms related to GERD. He reports some heartburn, dyspepsia, and indigestion. This has been going on for a while antacid. Patient have used 14-day Prilosec in the past which worked while he was on it. However, he stopped taking it and symptoms are back. Patient is aware of some of the food triggers which he tries to avoid. Food that he noted were pizza he thinks that is mostly the minor sauce.   He tries to avoid spicy food because that is also 1 big trigger. Patient have also tried some Tums in the past which provides very poor relief but continues to use it. No previous evaluation done. Pepcid started on previous visit. Patient reports fair success. Patient states that he still took a few times with specific food triggers. Heartburn going away. ABDOMINAL PAIN DIARRHEA CONSTIPATION/FATTY LIVER   Patient reported improvement with abdominal pain, GERD symptoms. Especially since his change his diet. He is reported that most symptoms have been related to certain food especially pop. Since he has cut down drinking pop most of his symptoms have improved. Including the nausea, and diarrhea. He continues to take some occasional Pepcid and Bentyl if he needs which he reports has been very successful. Patient has a known history of IBS mixed. Patient has been having some issues with diarrhea and constipation for awhile now. PREVIOUS NOTES   Patient felt like it had gotten worse in the past 2 months. Patient states that he would have diarrhea and continue to take Imodium and then afterwards will have constipation after that. Patient have never had any evaluation done as well. Patient states that he is never noticed any blood in his stools. He does complain of some abdominal cramps. Patient started on Pepcid-Bentyl- regularly. He has never been to the any emergency room or urgent care for any evaluation for any of this. Patient had tried taking probiotics which provides very mild relief. He is aware that he is probably going to need to add a little bit more fiber to his diet. Patient reports great success with Bentyl. Depends on what he eats- hurts more. Chicken- cottage cheese. WEIGHT  Patient's BMI is Body mass index is 32.78 kg/m². kg/m2. BMI is decreasing. Patient understands that this condition increases the patient's risk for chronic conditions.   Patient understands that this condition increases the patient's risk for chronic conditions. Patient continues to lose weight staying fairly he lost 15 pounds from November. Active and continues to watch what he eats. He is losing weight on his own he has never used any diet pills. Lost 11 pounds- T 25 - 5 day 25 minutes- watch 10,000 steps   Familial history of diabetes. Wt Readings from Last 3 Encounters:   02/25/22 235 lb (106.6 kg)   10/25/21 250 lb 12.8 oz (113.8 kg)   04/26/21 261 lb (118.4 kg)     LFT's/ FATTY LIVER  Lab Results   Component Value Date/Time    ALKPHOS 70 03/29/2021 11:38 AM    ALT 43 (H) 03/29/2021 11:38 AM    AST 28 03/29/2021 11:38 AM    BILITOT 0.53 03/29/2021 11:38 AM    PROT 7.7 03/29/2021 11:38 AM    LABALBU 4.8 03/29/2021 11:38 AM        Details of this discussion including any medical advice provided: below    PHYSICAL EXAM[INSTRUCTIONS:  \"[x]\" Indicates a positive item  \"[]\" Indicates a negative item  -- DELETE ALL ITEMS NOT EXAMINED]  Patient-Reported Vitals 2/25/2022   Patient-Reported Weight 238   Patient-Reported Height 5'10\"   Patient-Reported Systolic 041   Patient-Reported Diastolic 70   Patient-Reported Pulse 62   Patient-Reported Temperature 98.5   Patient-Reported SpO2 99     Constitutional: [x] No apparent distress    Mental status: [x] Alert and awake  [x] Oriented to person/place/time[] Abnormal -   Pulmonary/Chest: [x] Respiratory effort normal         [] Abnormal -          Other pertinent observable physical exam findings:-moderately anxious,   PLAN  1. Adjustment disorder with anxious mood  Failure to Improve. Discussed how to recognize anxiety. Advised to relieve tension with exercise or a massage. Advised to get enough rest.  Advised to avoid alcohol, caffeine, nicotine, and illegal drugs. Which can increase anxiety level and cause sleep problems. 2. Mixed hyperlipidemia  Stable  Continue therapy.   Advised to decrease the consumption of red meats, fried foods, trans fats, sweets, sugary beverages. Advised to increase fish, vegetables, and fruits consumption. Advised to add fiber or OTC supplements in diet. Discussed weight loss which will result in improvement of lipids levels. Advised to increase daily physical activities and add regular exercises. - Comprehensive Metabolic Panel, Fasting; Future  - Lipid, Fasting; Future    3. Herpes simplex infection of perianal skin  Stable  Continue to monitor      4. Prediabetes  Stable  DISCUSSED and ADVISED TO:  Decrease carbohydrates, sugary drinks, desserts   Exercise regularly, as tolerated. Try to lose weight.    - CBC with Auto Differential; Future  - Comprehensive Metabolic Panel, Fasting; Future  - Urinalysis with Reflex to Culture; Future  - Hemoglobin A1C; Future    5. Irritable bowel syndrome with both constipation and diarrhea  Stable  DISCUSSED and ADVISED TO:  Drink plenty of fluids, enough until urine is light yellow or clear like water. Choose water and other caffeine-free clear liquids. If you do not feel like eating or drinking, try taking small sips of water, sports drinks, or other rehydration drinks. Get plenty of rest.  Go to the Emergency Room if you are not able to tolerate any food or water. 6. Gastroesophageal reflux disease, unspecified whether esophagitis present  Stable  Continue therapy  DISCUSSED AND ADVISED TO:  Avoid food triggers. Stop eating large meals close to bedtime  Don't eat meals too close to bedtime  Avoid ASA, NSAID's, caffeine, peppermints, alcohol and tobacco.  Report for worsening symptoms. 7. Fatty liver  Stable  Continue to monitor    - Comprehensive Metabolic Panel, Fasting; Future    8. Vitamin D deficiency  Continue Vitamin D supplementation  DISCUSSED AND ADVISED TO:  Foods that contain a lot of vitamin D includes [place], tuna, and mackerel.    Cheese, egg yolks, and beef liver have small amounts of vit D.  Milk, soy drinks, orange juice, yogurt, margarine, and some kinds of cereal have vitamin D added to them. Continue to use sunblock when out in the sun to prevent skin cancer.    - Vitamin D 25 Hydroxy; Future  - vitamin D (CHOLECALCIFEROL) 25 MCG (1000 UT) TABS tablet; Take 1 tablet by mouth daily  Dispense: 90 tablet; Refill: 5    9. Increase in creatinine  Failure to Improve  Continue to monitor    - CBC with Auto Differential; Future  - Comprehensive Metabolic Panel, Fasting; Future  - Urinalysis with Reflex to Culture; Future    10. Obesity (BMI 35.0-39.9 without comorbidity)  Improving  BMI decreasing  DISCUSSED AND ADVISED TO:  Eat a low-fat and low carbohydrates diet. Avoid fried foods especially fast food. Choose healthier options for snacks. Have 5-6 servings of fruits and vegetables per day. Cut down on eating processed food. Add 30 minutes to 1 hour aerobic exercise for 3-4 days a week. - CBC with Auto Differential; Future  - Comprehensive Metabolic Panel, Fasting; Future  - Urinalysis with Reflex to Culture; Future  - Hemoglobin A1C; Future    11. Hyperglycemia  Recommended    - CBC with Auto Differential; Future  - Comprehensive Metabolic Panel, Fasting; Future  - Urinalysis with Reflex to Culture; Future  - Hemoglobin A1C; Future     I affirm this is a Patient Initiated Episode with a Patient who has not had a related appointment within my department in the past 7 days or scheduled within the next 24 hours. Patient identification was verified at the start of the visit: Yes  Total Time: minutes: 21-30 minutes  Daljit Pena was evaluated through a synchronous (real-time) audio encounter. The patient was located at home in a state where the provider was licensed to provide care. Note: not billable if this call serves to triage the patient into an appointment for the relevant concern  This note was completed by using the assistance of a speech-recognition program. However, inadvertent computerized transcription errors may be present.  Although every effort was made to ensure accuracy, no guarantees can be provided that every mistake has been identified and corrected by editing.   Electronically signed by HARDIK Osorio CNP on 2/24/22 at 7:13 PM EST

## 2022-03-15 DIAGNOSIS — K21.9 GASTROESOPHAGEAL REFLUX DISEASE, UNSPECIFIED WHETHER ESOPHAGITIS PRESENT: ICD-10-CM

## 2022-03-15 DIAGNOSIS — K58.2 IRRITABLE BOWEL SYNDROME WITH BOTH CONSTIPATION AND DIARRHEA: ICD-10-CM

## 2022-03-15 RX ORDER — FAMOTIDINE 20 MG/1
TABLET, FILM COATED ORAL
Qty: 180 TABLET | Refills: 2 | Status: SHIPPED | OUTPATIENT
Start: 2022-03-15

## 2022-03-15 RX ORDER — DICYCLOMINE HYDROCHLORIDE 10 MG/1
CAPSULE ORAL
Qty: 360 CAPSULE | Refills: 2 | Status: SHIPPED | OUTPATIENT
Start: 2022-03-15

## 2022-04-15 DIAGNOSIS — E78.2 MIXED HYPERLIPIDEMIA: ICD-10-CM

## 2022-04-15 RX ORDER — PRAVASTATIN SODIUM 20 MG
TABLET ORAL
Qty: 90 TABLET | Refills: 1 | Status: SHIPPED | OUTPATIENT
Start: 2022-04-15 | End: 2022-10-10

## 2022-08-11 DIAGNOSIS — A60.1 HERPES SIMPLEX INFECTION OF PERIANAL SKIN: Primary | ICD-10-CM

## 2022-08-11 RX ORDER — VALACYCLOVIR HYDROCHLORIDE 1 G/1
1000 TABLET, FILM COATED ORAL 2 TIMES DAILY
Qty: 20 TABLET | Refills: 0 | Status: SHIPPED | OUTPATIENT
Start: 2022-08-11 | End: 2022-09-20 | Stop reason: SDUPTHER

## 2022-09-20 DIAGNOSIS — A60.1 HERPES SIMPLEX INFECTION OF PERIANAL SKIN: ICD-10-CM

## 2022-09-20 RX ORDER — VALACYCLOVIR HYDROCHLORIDE 1 G/1
1000 TABLET, FILM COATED ORAL 2 TIMES DAILY
Qty: 20 TABLET | Refills: 0 | Status: SHIPPED | OUTPATIENT
Start: 2022-09-20 | End: 2022-09-30

## 2022-10-09 DIAGNOSIS — E78.2 MIXED HYPERLIPIDEMIA: ICD-10-CM

## 2022-10-10 RX ORDER — PRAVASTATIN SODIUM 20 MG
20 TABLET ORAL DAILY
Qty: 30 TABLET | Refills: 0 | Status: SHIPPED | OUTPATIENT
Start: 2022-10-10 | End: 2022-11-08

## 2022-10-10 NOTE — TELEPHONE ENCOUNTER
Last Visit:  2/25/2022     Next Visit Date:  No future appointments.     Health Maintenance   Topic Date Due    COVID-19 Vaccine (1) Never done    A1C test (Diabetic or Prediabetic)  03/29/2022    Lipids  03/29/2022    Flu vaccine (1) Never done    Depression Screen  10/25/2022    DTaP/Tdap/Td vaccine (6 - Td or Tdap) 10/25/2031    Hib vaccine  Completed    Hepatitis C screen  Completed    HIV screen  Completed    Hepatitis A vaccine  Aged Out    Meningococcal (ACWY) vaccine  Aged Out    Pneumococcal 0-64 years Vaccine  Aged Out    Varicella vaccine  Discontinued       Hemoglobin A1C (%)   Date Value   03/29/2021 5.4             ( goal A1C is < 7)   No results found for: LABMICR  LDL Cholesterol (mg/dL)   Date Value   03/29/2021 132 (H)   07/20/2016 109       (goal LDL is <100)   AST (U/L)   Date Value   03/29/2021 28     ALT (U/L)   Date Value   03/29/2021 43 (H)     BUN (mg/dL)   Date Value   03/29/2021 18     BP Readings from Last 3 Encounters:   10/25/21 128/86   04/26/21 118/70   03/29/21 110/80          (goal 120/80)    All Future Testing planned in [facility]  Lab Frequency Next Occurrence   CBC with Auto Differential Once 08/22/2023   Comprehensive Metabolic Panel, Fasting Once 08/22/2023   Lipid, Fasting Once 08/22/2023   Urinalysis with Reflex to Culture Once 08/22/2023   Vitamin D 25 Hydroxy Once 08/22/2023   Hemoglobin A1C Once 08/25/2023               Patient Active Problem List:     Obesity (BMI 35.0-39.9 without comorbidity)     S/P tonsillectomy     S/P medial meniscus repair of right knee     History of COVID-19     Gastroesophageal reflux disease     Irritable bowel syndrome with both constipation and diarrhea     Family history of testicular cancer     Dyslipidemia     Vitamin D deficiency     Increase in creatinine     Fatty liver     Prediabetes     Herpes simplex infection of perianal skin

## 2022-11-08 DIAGNOSIS — E78.2 MIXED HYPERLIPIDEMIA: ICD-10-CM

## 2022-11-08 RX ORDER — PRAVASTATIN SODIUM 20 MG
TABLET ORAL
Qty: 30 TABLET | Refills: 1 | Status: SHIPPED | OUTPATIENT
Start: 2022-11-08

## 2022-12-06 DIAGNOSIS — K58.2 IRRITABLE BOWEL SYNDROME WITH BOTH CONSTIPATION AND DIARRHEA: ICD-10-CM

## 2022-12-06 DIAGNOSIS — K21.9 GASTROESOPHAGEAL REFLUX DISEASE, UNSPECIFIED WHETHER ESOPHAGITIS PRESENT: ICD-10-CM

## 2022-12-06 RX ORDER — FAMOTIDINE 20 MG/1
TABLET, FILM COATED ORAL
Qty: 90 TABLET | Refills: 0 | Status: SHIPPED | OUTPATIENT
Start: 2022-12-06

## 2022-12-06 RX ORDER — DICYCLOMINE HYDROCHLORIDE 10 MG/1
CAPSULE ORAL
Qty: 360 CAPSULE | Refills: 0 | Status: SHIPPED | OUTPATIENT
Start: 2022-12-06

## 2025-01-27 ENCOUNTER — OFFICE VISIT (OUTPATIENT)
Age: 30
End: 2025-01-27
Payer: COMMERCIAL

## 2025-01-27 VITALS
DIASTOLIC BLOOD PRESSURE: 74 MMHG | HEART RATE: 90 BPM | WEIGHT: 260 LBS | TEMPERATURE: 98.8 F | OXYGEN SATURATION: 94 % | SYSTOLIC BLOOD PRESSURE: 112 MMHG | HEIGHT: 71 IN | BODY MASS INDEX: 36.4 KG/M2

## 2025-01-27 DIAGNOSIS — L05.01 PILONIDAL CYST WITH ABSCESS: Primary | ICD-10-CM

## 2025-01-27 PROCEDURE — 1036F TOBACCO NON-USER: CPT | Performed by: NURSE PRACTITIONER

## 2025-01-27 PROCEDURE — G8417 CALC BMI ABV UP PARAM F/U: HCPCS | Performed by: NURSE PRACTITIONER

## 2025-01-27 PROCEDURE — G8427 DOCREV CUR MEDS BY ELIG CLIN: HCPCS | Performed by: NURSE PRACTITIONER

## 2025-01-27 PROCEDURE — 99203 OFFICE O/P NEW LOW 30 MIN: CPT | Performed by: NURSE PRACTITIONER

## 2025-01-27 RX ORDER — CEPHALEXIN 500 MG/1
500 CAPSULE ORAL 3 TIMES DAILY
Qty: 42 CAPSULE | Refills: 0 | Status: SHIPPED | OUTPATIENT
Start: 2025-01-27 | End: 2025-02-10

## 2025-01-27 RX ORDER — SULFAMETHOXAZOLE AND TRIMETHOPRIM 800; 160 MG/1; MG/1
1 TABLET ORAL 2 TIMES DAILY
COMMUNITY
Start: 2025-01-24 | End: 2025-02-03

## 2025-01-27 RX ORDER — SULFAMETHOXAZOLE AND TRIMETHOPRIM 800; 160 MG/1; MG/1
1 TABLET ORAL 2 TIMES DAILY
Qty: 8 TABLET | Refills: 0 | Status: SHIPPED | OUTPATIENT
Start: 2025-01-27 | End: 2025-01-31

## 2025-01-27 ASSESSMENT — ENCOUNTER SYMPTOMS
ABDOMINAL PAIN: 0
NAUSEA: 0
SHORTNESS OF BREATH: 0
VOMITING: 0
ROS SKIN COMMENTS: SEE HPI.
COUGH: 0
RHINORRHEA: 0

## 2025-01-27 NOTE — PROGRESS NOTES
[facility] General Surgery   Kaden Dong MD, FACS  Pily Gil, APRN-CNP  [address], [address]  [place] [address]  P: [phone], F: [phone]    General and Robotic Surgery  Consult Note               PATIENT NAME: Dipesh Tinoco   :  1995   MRN: [id number]   PCP:  Noah Gresham MD     TODAY'S DATE: 2025    Chief Complaint   Patient presents with    New Patient     discuss pilonidal cyst       HISTORY OF PRESENT ILLNESS: 30 y.o. male presents to discuss pilonidal cyst.  States 1 prior issue with pilonidal cyst about 6 months to a year ago.  On  notes pressure to tail bone area, it did eventually rupture 4-5 days later. Saw PCP the next day, rx'd on Bactrim DS for 10 days. Denies pain currently. Denies fever/chills. Normal BM's- hx of IBS. No further drainage. Ha sbeen using heating pad, last drainage noted was Friday. He is very active, employed as a .    Major medical hx: None  Prior ABD/pelvic surgeries: None  Blood thinning medications: None    PAST MEDICAL HISTORY     Past Medical History:   Diagnosis Date    Fatty liver 2021       PROBLEM LIST     Patient Active Problem List   Diagnosis    Obesity (BMI 35.0-39.9 without comorbidity)    S/P tonsillectomy    S/P medial meniscus repair of right knee    History of COVID-19    Gastroesophageal reflux disease    Irritable bowel syndrome with both constipation and diarrhea    Family history of testicular cancer    Dyslipidemia    Vitamin D deficiency    Increase in creatinine    Fatty liver    Prediabetes    Herpes simplex infection of perianal skin       SURGICAL HISTORY       Past Surgical History:   Procedure Laterality Date    KNEE SURGERY      TONSILLECTOMY         ALLERGIES   No Known Allergies    FAMILY HISTORY   family history includes Other in his mother.    SOCIAL HISTORY    reports that he has never smoked. He has never used smokeless tobacco. He reports current alcohol use. He

## 2025-02-01 PROBLEM — L05.01 PILONIDAL CYST WITH ABSCESS: Status: ACTIVE | Noted: 2025-02-01

## 2025-02-13 ENCOUNTER — OFFICE VISIT (OUTPATIENT)
Age: 30
End: 2025-02-13
Payer: COMMERCIAL

## 2025-02-13 VITALS
SYSTOLIC BLOOD PRESSURE: 127 MMHG | BODY MASS INDEX: 36.4 KG/M2 | OXYGEN SATURATION: 97 % | DIASTOLIC BLOOD PRESSURE: 84 MMHG | HEIGHT: 71 IN | HEART RATE: 75 BPM | TEMPERATURE: 98 F | WEIGHT: 260 LBS

## 2025-02-13 DIAGNOSIS — L05.91 PILONIDAL CYST: Primary | ICD-10-CM

## 2025-02-13 PROCEDURE — 99214 OFFICE O/P EST MOD 30 MIN: CPT | Performed by: SURGERY

## 2025-02-13 PROCEDURE — G8427 DOCREV CUR MEDS BY ELIG CLIN: HCPCS | Performed by: SURGERY

## 2025-02-13 PROCEDURE — G8417 CALC BMI ABV UP PARAM F/U: HCPCS | Performed by: SURGERY

## 2025-02-13 PROCEDURE — 1036F TOBACCO NON-USER: CPT | Performed by: SURGERY

## 2025-02-13 NOTE — PATIENT INSTRUCTIONS
Options are monitoring the cyst for recurrence or surgical excision.   Plan will be to just monitor cyst for now and call if symptoms worsen.  You will need a couple of weeks off work post op (typically 2-4 weeks).   Plan for 4 weeks off work.  The best time for surgical removal is when there is no active infection.   There can be delayed wound healing and wound infection.   Call office any time if you would like to proceed with surgery.